# Patient Record
Sex: FEMALE | Race: BLACK OR AFRICAN AMERICAN | Employment: FULL TIME | URBAN - METROPOLITAN AREA
[De-identification: names, ages, dates, MRNs, and addresses within clinical notes are randomized per-mention and may not be internally consistent; named-entity substitution may affect disease eponyms.]

---

## 2024-06-13 ENCOUNTER — APPOINTMENT (EMERGENCY)
Dept: MRI IMAGING | Facility: HOSPITAL | Age: 39
End: 2024-06-13
Payer: COMMERCIAL

## 2024-06-13 ENCOUNTER — APPOINTMENT (EMERGENCY)
Dept: ULTRASOUND IMAGING | Facility: HOSPITAL | Age: 39
End: 2024-06-13
Payer: COMMERCIAL

## 2024-06-13 ENCOUNTER — HOSPITAL ENCOUNTER (OUTPATIENT)
Facility: HOSPITAL | Age: 39
Setting detail: OBSERVATION
Discharge: HOME | End: 2024-06-14
Attending: EMERGENCY MEDICINE | Admitting: STUDENT IN AN ORGANIZED HEALTH CARE EDUCATION/TRAINING PROGRAM
Payer: COMMERCIAL

## 2024-06-13 ENCOUNTER — APPOINTMENT (EMERGENCY)
Dept: CT IMAGING | Facility: HOSPITAL | Age: 39
End: 2024-06-13
Payer: COMMERCIAL

## 2024-06-13 ENCOUNTER — APPOINTMENT (OUTPATIENT)
Dept: NON INVASIVE DIAGNOSTICS | Facility: HOSPITAL | Age: 39
End: 2024-06-13
Payer: COMMERCIAL

## 2024-06-13 DIAGNOSIS — I63.9 CVA (CEREBRAL VASCULAR ACCIDENT) (HCC): Primary | ICD-10-CM

## 2024-06-13 PROBLEM — O20.0 THREATENED MISCARRIAGE IN EARLY PREGNANCY: Status: ACTIVE | Noted: 2024-06-13

## 2024-06-13 PROBLEM — R51.9 HEADACHE: Status: ACTIVE | Noted: 2024-06-13

## 2024-06-13 PROBLEM — I10 HYPERTENSION: Status: ACTIVE | Noted: 2024-06-13

## 2024-06-13 PROBLEM — I63.512 ACUTE ISCHEMIC LEFT MIDDLE CEREBRAL ARTERY (MCA) STROKE (HCC): Status: ACTIVE | Noted: 2024-06-13

## 2024-06-13 LAB
ALBUMIN SERPL BCP-MCNC: 3.7 G/DL (ref 3.5–5)
ALP SERPL-CCNC: 46 U/L (ref 34–104)
ALT SERPL W P-5'-P-CCNC: 23 U/L (ref 7–52)
ANION GAP SERPL CALCULATED.3IONS-SCNC: 6 MMOL/L (ref 4–13)
APTT PPP: 27 SECONDS (ref 23–37)
AST SERPL W P-5'-P-CCNC: 17 U/L (ref 13–39)
B-HCG SERPL-ACNC: 28.1 MIU/ML (ref 0–5)
BACTERIA UR QL AUTO: ABNORMAL /HPF
BASOPHILS # BLD AUTO: 0.02 THOUSANDS/ÂΜL (ref 0–0.1)
BASOPHILS NFR BLD AUTO: 0 % (ref 0–1)
BILIRUB SERPL-MCNC: 0.53 MG/DL (ref 0.2–1)
BILIRUB UR QL STRIP: NEGATIVE
BUN SERPL-MCNC: 10 MG/DL (ref 5–25)
CALCIUM SERPL-MCNC: 8.8 MG/DL (ref 8.4–10.2)
CHLORIDE SERPL-SCNC: 106 MMOL/L (ref 96–108)
CLARITY UR: CLEAR
CO2 SERPL-SCNC: 26 MMOL/L (ref 21–32)
COLOR UR: YELLOW
CREAT SERPL-MCNC: 0.95 MG/DL (ref 0.6–1.3)
EOSINOPHIL # BLD AUTO: 0.17 THOUSAND/ÂΜL (ref 0–0.61)
EOSINOPHIL NFR BLD AUTO: 3 % (ref 0–6)
ERYTHROCYTE [DISTWIDTH] IN BLOOD BY AUTOMATED COUNT: 11.7 % (ref 11.6–15.1)
EXT PREGNANCY TEST URINE: POSITIVE
EXT. CONTROL: ABNORMAL
GFR SERPL CREATININE-BSD FRML MDRD: 77 ML/MIN/1.73SQ M
GLUCOSE SERPL-MCNC: 104 MG/DL (ref 65–140)
GLUCOSE UR STRIP-MCNC: NEGATIVE MG/DL
HCT VFR BLD AUTO: 37.8 % (ref 34.8–46.1)
HGB BLD-MCNC: 12.3 G/DL (ref 11.5–15.4)
HGB UR QL STRIP.AUTO: ABNORMAL
IMM GRANULOCYTES # BLD AUTO: 0.01 THOUSAND/UL (ref 0–0.2)
IMM GRANULOCYTES NFR BLD AUTO: 0 % (ref 0–2)
INR PPP: 1.04 (ref 0.84–1.19)
KETONES UR STRIP-MCNC: NEGATIVE MG/DL
LEUKOCYTE ESTERASE UR QL STRIP: ABNORMAL
LYMPHOCYTES # BLD AUTO: 1.8 THOUSANDS/ÂΜL (ref 0.6–4.47)
LYMPHOCYTES NFR BLD AUTO: 33 % (ref 14–44)
MCH RBC QN AUTO: 29.4 PG (ref 26.8–34.3)
MCHC RBC AUTO-ENTMCNC: 32.5 G/DL (ref 31.4–37.4)
MCV RBC AUTO: 90 FL (ref 82–98)
MONOCYTES # BLD AUTO: 0.52 THOUSAND/ÂΜL (ref 0.17–1.22)
MONOCYTES NFR BLD AUTO: 10 % (ref 4–12)
MUCOUS THREADS UR QL AUTO: ABNORMAL
NEUTROPHILS # BLD AUTO: 2.88 THOUSANDS/ÂΜL (ref 1.85–7.62)
NEUTS SEG NFR BLD AUTO: 54 % (ref 43–75)
NITRITE UR QL STRIP: NEGATIVE
NON-SQ EPI CELLS URNS QL MICRO: ABNORMAL /HPF
NRBC BLD AUTO-RTO: 0 /100 WBCS
PH UR STRIP.AUTO: 6 [PH]
PLATELET # BLD AUTO: 315 THOUSANDS/UL (ref 149–390)
PMV BLD AUTO: 10.2 FL (ref 8.9–12.7)
POTASSIUM SERPL-SCNC: 3.9 MMOL/L (ref 3.5–5.3)
PROT SERPL-MCNC: 6.9 G/DL (ref 6.4–8.4)
PROT UR STRIP-MCNC: ABNORMAL MG/DL
PROTHROMBIN TIME: 13.5 SECONDS (ref 11.6–14.5)
RBC # BLD AUTO: 4.18 MILLION/UL (ref 3.81–5.12)
RBC #/AREA URNS AUTO: ABNORMAL /HPF
SARS-COV-2 AG UPPER RESP QL IA: NEGATIVE
SARS-COV-2 AG UPPER RESP QL IA: NORMAL
SODIUM SERPL-SCNC: 138 MMOL/L (ref 135–147)
SP GR UR STRIP.AUTO: 1.02 (ref 1–1.03)
UROBILINOGEN UR QL STRIP.AUTO: 0.2 E.U./DL
WBC # BLD AUTO: 5.4 THOUSAND/UL (ref 4.31–10.16)
WBC #/AREA URNS AUTO: ABNORMAL /HPF

## 2024-06-13 PROCEDURE — 87811 SARS-COV-2 COVID19 W/OPTIC: CPT | Performed by: STUDENT IN AN ORGANIZED HEALTH CARE EDUCATION/TRAINING PROGRAM

## 2024-06-13 PROCEDURE — 81025 URINE PREGNANCY TEST: CPT | Performed by: EMERGENCY MEDICINE

## 2024-06-13 PROCEDURE — 70498 CT ANGIOGRAPHY NECK: CPT

## 2024-06-13 PROCEDURE — 36415 COLL VENOUS BLD VENIPUNCTURE: CPT | Performed by: EMERGENCY MEDICINE

## 2024-06-13 PROCEDURE — 85610 PROTHROMBIN TIME: CPT | Performed by: EMERGENCY MEDICINE

## 2024-06-13 PROCEDURE — 96361 HYDRATE IV INFUSION ADD-ON: CPT

## 2024-06-13 PROCEDURE — 76815 OB US LIMITED FETUS(S): CPT

## 2024-06-13 PROCEDURE — 85025 COMPLETE CBC W/AUTO DIFF WBC: CPT | Performed by: EMERGENCY MEDICINE

## 2024-06-13 PROCEDURE — 84702 CHORIONIC GONADOTROPIN TEST: CPT | Performed by: EMERGENCY MEDICINE

## 2024-06-13 PROCEDURE — 70551 MRI BRAIN STEM W/O DYE: CPT

## 2024-06-13 PROCEDURE — 99223 1ST HOSP IP/OBS HIGH 75: CPT | Performed by: STUDENT IN AN ORGANIZED HEALTH CARE EDUCATION/TRAINING PROGRAM

## 2024-06-13 PROCEDURE — 85730 THROMBOPLASTIN TIME PARTIAL: CPT | Performed by: EMERGENCY MEDICINE

## 2024-06-13 PROCEDURE — 99291 CRITICAL CARE FIRST HOUR: CPT | Performed by: EMERGENCY MEDICINE

## 2024-06-13 PROCEDURE — 70544 MR ANGIOGRAPHY HEAD W/O DYE: CPT

## 2024-06-13 PROCEDURE — 99245 OFF/OP CONSLTJ NEW/EST HI 55: CPT | Performed by: PSYCHIATRY & NEUROLOGY

## 2024-06-13 PROCEDURE — 80053 COMPREHEN METABOLIC PANEL: CPT | Performed by: EMERGENCY MEDICINE

## 2024-06-13 PROCEDURE — 96375 TX/PRO/DX INJ NEW DRUG ADDON: CPT

## 2024-06-13 PROCEDURE — 81001 URINALYSIS AUTO W/SCOPE: CPT | Performed by: EMERGENCY MEDICINE

## 2024-06-13 PROCEDURE — 96374 THER/PROPH/DIAG INJ IV PUSH: CPT

## 2024-06-13 PROCEDURE — 83036 HEMOGLOBIN GLYCOSYLATED A1C: CPT | Performed by: PSYCHIATRY & NEUROLOGY

## 2024-06-13 PROCEDURE — 99284 EMERGENCY DEPT VISIT MOD MDM: CPT

## 2024-06-13 PROCEDURE — 93005 ELECTROCARDIOGRAM TRACING: CPT

## 2024-06-13 PROCEDURE — 70496 CT ANGIOGRAPHY HEAD: CPT

## 2024-06-13 RX ORDER — LEVOTHYROXINE SODIUM 0.07 MG/1
75 TABLET ORAL DAILY
COMMUNITY

## 2024-06-13 RX ORDER — ACETAMINOPHEN 325 MG/1
650 TABLET ORAL EVERY 6 HOURS PRN
Status: DISCONTINUED | OUTPATIENT
Start: 2024-06-13 | End: 2024-06-14 | Stop reason: HOSPADM

## 2024-06-13 RX ORDER — HEPARIN SODIUM 5000 [USP'U]/ML
5000 INJECTION, SOLUTION INTRAVENOUS; SUBCUTANEOUS EVERY 8 HOURS SCHEDULED
Status: DISCONTINUED | OUTPATIENT
Start: 2024-06-13 | End: 2024-06-14 | Stop reason: HOSPADM

## 2024-06-13 RX ORDER — LABETALOL 200 MG/1
200 TABLET, FILM COATED ORAL 2 TIMES DAILY
COMMUNITY

## 2024-06-13 RX ORDER — LEVOTHYROXINE SODIUM 0.07 MG/1
75 TABLET ORAL
Status: DISCONTINUED | OUTPATIENT
Start: 2024-06-14 | End: 2024-06-14 | Stop reason: HOSPADM

## 2024-06-13 RX ORDER — DIPHENHYDRAMINE HYDROCHLORIDE 50 MG/ML
25 INJECTION INTRAMUSCULAR; INTRAVENOUS ONCE
Status: COMPLETED | OUTPATIENT
Start: 2024-06-13 | End: 2024-06-13

## 2024-06-13 RX ORDER — METOCLOPRAMIDE HYDROCHLORIDE 5 MG/ML
10 INJECTION INTRAMUSCULAR; INTRAVENOUS ONCE
Status: COMPLETED | OUTPATIENT
Start: 2024-06-13 | End: 2024-06-13

## 2024-06-13 RX ORDER — LABETALOL 200 MG/1
200 TABLET, FILM COATED ORAL EVERY 12 HOURS SCHEDULED
Status: DISCONTINUED | OUTPATIENT
Start: 2024-06-13 | End: 2024-06-14 | Stop reason: HOSPADM

## 2024-06-13 RX ORDER — HYDROCODONE BITARTRATE AND ACETAMINOPHEN 5; 325 MG/1; MG/1
1 TABLET ORAL ONCE
Status: DISCONTINUED | OUTPATIENT
Start: 2024-06-13 | End: 2024-06-13

## 2024-06-13 RX ADMIN — LABETALOL HYDROCHLORIDE 200 MG: 200 TABLET, FILM COATED ORAL at 14:54

## 2024-06-13 RX ADMIN — DIPHENHYDRAMINE HYDROCHLORIDE 25 MG: 50 INJECTION, SOLUTION INTRAMUSCULAR; INTRAVENOUS at 07:44

## 2024-06-13 RX ADMIN — METOCLOPRAMIDE 10 MG: 5 INJECTION, SOLUTION INTRAMUSCULAR; INTRAVENOUS at 07:44

## 2024-06-13 RX ADMIN — HEPARIN SODIUM 5000 UNITS: 5000 INJECTION, SOLUTION INTRAVENOUS; SUBCUTANEOUS at 14:54

## 2024-06-13 RX ADMIN — IOHEXOL 85 ML: 350 INJECTION, SOLUTION INTRAVENOUS at 08:18

## 2024-06-13 RX ADMIN — LABETALOL HYDROCHLORIDE 200 MG: 200 TABLET, FILM COATED ORAL at 21:20

## 2024-06-13 RX ADMIN — SODIUM CHLORIDE 1000 ML: 0.9 INJECTION, SOLUTION INTRAVENOUS at 07:45

## 2024-06-13 RX ADMIN — ACETAMINOPHEN 650 MG: 325 TABLET, FILM COATED ORAL at 21:20

## 2024-06-13 RX ADMIN — HEPARIN SODIUM 5000 UNITS: 5000 INJECTION, SOLUTION INTRAVENOUS; SUBCUTANEOUS at 21:20

## 2024-06-13 RX ADMIN — ASPIRIN 81 MG: 81 TABLET, COATED ORAL at 15:05

## 2024-06-13 NOTE — ASSESSMENT & PLAN NOTE
Suspect associated with coagulability effects of hormone changes in/around pregnancy.    -Recommend echocardiogram with bubble study.  -Outpatient cardiology evaluation for possible loop recorder placement.  - Recommend thrombosis panel to be drawn in 6 to 8 weeks.  - If okay with OB and hemoglobin is stable, recommend aspirin 81 mg daily for the next 2 to 3 months.  -Would also recommend aspirin 81 mg daily throughout the course of any future pregnancies or hormone therapies associated with IVF.  -Okay to defer statin given the likely provoked nature of stroke and less likely to be due to atherosclerotic or traditional risk factors.  -Check A1c and FLP.-Will keep overnight for observation.  - No additional recommendations at this time  - Office will call patient arrange outpatient stroke follow-up.  - Please call question/concerns

## 2024-06-13 NOTE — ASSESSMENT & PLAN NOTE
Patient with history of hypertension, states she used to be on amlodipine but is maintained on labetalol in the outpatient setting  Denies known history of preeclampsia  Resume Labetalol 200mg po q12h, BP appears improved since admission  Monitor on telemetry

## 2024-06-13 NOTE — H&P
"[facility]  H&P  Name: Emmett Sharif 38 y.o. female I MRN: [id number]  Unit/Bed#: -01 I Date of Admission: 2024   Date of Service: 2024 I Hospital Day: 0      Assessment & Plan   Threatened miscarriage in early pregnancy  Assessment & Plan  Patient is 5 weeks gestation and presents for vaginal bleeding and decreasing beta-hCG  B-HCG in ED 28.1. Patient has been having routine BHCG monitoring though outside lab (patient states RMA). She tells me her previous HCG values were decreasing at 150 -> 40 -> 35  Ultrasound in ED read as \"No intrauterine gestation or adnexal mass. Differential remains early IUP, spontaneous  and ectopic pregnancy. Correlate with serial quantitative BHCG.\"  OBGYN consulted and we appreciate their expertise. Recommend repeat B-HCG in morning    Headache  Assessment & Plan  Patient presenting for 36 hours of headache accompanied by some mental fogginess.  Found to have acute MCA CVA on MRI  Patient endorses improvement in headache with snacks since admission.  She would like to hold off of opiates until repeat Bhgb in AM in the event that her pregnancy is in fact viable  Tylenol PRN    Hypertension  Assessment & Plan  Patient with history of hypertension, states she used to be on amlodipine but is maintained on labetalol in the outpatient setting  Denies known history of preeclampsia  Resume Labetalol 200mg po q12h, BP appears improved since admission  Monitor on telemetry    Acute ischemic left middle cerebral artery (MCA) stroke (HCC)  Assessment & Plan  This is a pleasant but unfortunate 38-year-old female who is at 5 weeks gestation for headache which started yesterday.  She states that around 10 AM yesterday she had a headache while at work.  She states her BP is usually controlled at home on labetalol but she has not been checking her pressures very closely  She came to the ED where MRIs demonstrating concern for left temporal lobe " CVA  Neurology and OB/GYN were consulted and patient is to be admitted.  Appreciate recommendations regarding further neurologic testing  Obs, echocardiogram ordered, monitor on telemetry  Heparin for DVT prophylaxis  Discussed with OB and we are okay to start aspirin 81 mg daily  Etiology of CVA likely related to hormonal aberrations during pregnancy.  Patient has had trouble conceiving in the past and needed to use IVF.  Unclear if there could be concern for process such as antiphospholipid syndrome, will discuss with neurology the utility of further outpatient testing           VTE Pharmacologic Prophylaxis:   Moderate Risk (Score 3-4) - Pharmacological DVT Prophylaxis Ordered: heparin.  Code Status: Level 1 - Full Code   Discussion with family: Updated  ( and mother) at bedside.    Anticipated Length of Stay: Patient will be admitted on an observation basis with an anticipated length of stay of less than 2 midnights secondary to CVA.    Total Time Spent on Date of Encounter in care of patient: 45 mins. This time was spent on one or more of the following: performing physical exam; counseling and coordination of care; obtaining or reviewing history; documenting in the medical record; reviewing/ordering tests, medications or procedures; communicating with other healthcare professionals and discussing with patient's family/caregivers.    Chief Complaint: Headache    History of Present Illness:  Emmett Sharif is a 38 y.o. female with a PMH of hypertension who presents with headache.  She is at 5 weeks gestation and states that she has had trouble conceiving in the past.  She has had successful pregnancies using IVF and this is her first confirmed pregnancy that had been conceived naturally.  She reports being in a normal state of health however beta-hCG levels have been decreasing in the outpatient setting with continued monitoring.  She notes that she developed a severe headache at around 10 AM  yesterday 6/12.  This occurred while at work and patient tried to manage her symptoms conservatively.  It became so bad that she decided to leave work early to come home and rest.  Tylenol did not seem to help symptoms.  Today she woke up and her headache was so bad that she knew she needed to come to the ED.  Workup in the ED included MRI which unfortunately appears concerning for acute temporal/occipital CVA.  Also of concern, patient has had some vaginal bleeding and her beta-hCG level has been decreasing there is concern that her pregnancy is not viable.  OB was consulted and recommended that patient have repeat beta-hCG in the morning.  At present patient feels comfortable.  She states that her headache is improving somewhat with snacks.  She has no new complaints at present    Review of Systems:  Review of Systems   Constitutional:  Negative for chills and fever.   HENT:  Negative for ear pain and sore throat.    Eyes:  Negative for visual disturbance.   Respiratory:  Negative for cough and shortness of breath.    Cardiovascular:  Negative for chest pain and palpitations.   Gastrointestinal:  Negative for abdominal pain and vomiting.   Genitourinary:  Negative for dysuria and hematuria.   Musculoskeletal:  Negative for arthralgias and back pain.   Skin:  Negative for color change and rash.   Neurological:  Negative for syncope.   All other systems reviewed and are negative.      Past Medical and Surgical History:   Past Medical History:   Diagnosis Date    Hypertension        History reviewed. No pertinent surgical history.    Meds/Allergies:  Prior to Admission medications    Medication Sig Start Date End Date Taking? Authorizing Provider   labetalol (NORMODYNE) 200 mg tablet Take 200 mg by mouth 2 (two) times a day   Yes Historical Provider, MD   semaglutide, 1 mg/dose, (Ozempic, 1 MG/DOSE,) 4 mg/3 mL injection pen Inject 1 mg under the skin every 7 days 5/18/24  Yes Historical Provider, MD   levothyroxine  "75 mcg tablet Take 75 mcg by mouth daily    Historical Provider, MD     I have reviewed home medications with patient personally.    Allergies: No Known Allergies    Social History:  Marital Status: /Civil Union   Occupation: Education tech  Patient Pre-hospital Living Situation: Home  Patient Pre-hospital Level of Mobility: walks  Patient Pre-hospital Diet Restrictions: N/A  Substance Use History:   Social History     Substance and Sexual Activity   Alcohol Use Never     Social History     Tobacco Use   Smoking Status Never   Smokeless Tobacco Never     Social History     Substance and Sexual Activity   Drug Use Never       Family History:  History reviewed. No pertinent family history.    Physical Exam:     Vitals:   Blood Pressure: 131/80 (06/13/24 1633)  Pulse: 69 (06/13/24 1633)  Temperature: 97.5 °F (36.4 °C) (06/13/24 1633)  Temp Source: Oral (06/13/24 1633)  Respirations: 18 (06/13/24 1633)  Height: 5' 4\" (162.6 cm) (06/13/24 1337)  Weight - Scale: 96.6 kg (213 lb) (06/13/24 1337)  SpO2: 97 % (06/13/24 1633)    Physical Exam  Vitals reviewed.   Constitutional:       General: She is not in acute distress.     Appearance: She is not ill-appearing.   HENT:      Head: Normocephalic.      Mouth/Throat:      Mouth: Mucous membranes are moist.   Eyes:      General: No scleral icterus.     Extraocular Movements: Extraocular movements intact.   Cardiovascular:      Rate and Rhythm: Normal rate and regular rhythm.      Pulses: Normal pulses.      Heart sounds: No murmur heard.     No friction rub. No gallop.   Pulmonary:      Effort: Pulmonary effort is normal. No respiratory distress.      Breath sounds: No wheezing, rhonchi or rales.   Abdominal:      General: Abdomen is flat. Bowel sounds are normal. There is no distension.      Palpations: Abdomen is soft.      Tenderness: There is no abdominal tenderness. There is no guarding or rebound.   Musculoskeletal:      Right lower leg: No edema.      Left lower " "leg: No edema.   Skin:     General: Skin is warm.      Capillary Refill: Capillary refill takes less than 2 seconds.      Coloration: Skin is not jaundiced.      Findings: No rash.   Neurological:      General: No focal deficit present.      Mental Status: She is alert and oriented to person, place, and time.      Sensory: No sensory deficit.      Motor: No weakness.   Psychiatric:         Mood and Affect: Mood normal.         Behavior: Behavior normal.          Additional Data:     Lab Results:  Results from last 7 days   Lab Units 06/13/24  0727   WBC Thousand/uL 5.40   HEMOGLOBIN g/dL 12.3   HEMATOCRIT % 37.8   PLATELETS Thousands/uL 315   SEGS PCT % 54   LYMPHO PCT % 33   MONO PCT % 10   EOS PCT % 3     Results from last 7 days   Lab Units 06/13/24  0727   SODIUM mmol/L 138   POTASSIUM mmol/L 3.9   CHLORIDE mmol/L 106   CO2 mmol/L 26   BUN mg/dL 10   CREATININE mg/dL 0.95   ANION GAP mmol/L 6   CALCIUM mg/dL 8.8   ALBUMIN g/dL 3.7   TOTAL BILIRUBIN mg/dL 0.53   ALK PHOS U/L 46   ALT U/L 23   AST U/L 17   GLUCOSE RANDOM mg/dL 104     Results from last 7 days   Lab Units 06/13/24  0727   INR  1.04         No results found for: \"HGBA1C\"        Lines/Drains:  Invasive Devices       Peripheral Intravenous Line  Duration             Peripheral IV 06/13/24 Right Antecubital <1 day                        Imaging: Reviewed radiology reports from this admission including: MRI brain  MRV head wo contrast   Final Result by Joni Gandhi DO (06/13 1039)      No evidence of dural venous sinus thrombosis.               Workstation performed: [id number]         MRI brain wo contrast   Final Result by Joni Gandhi DO (06/13 1041)      Cortical diffusion abnormality in the left temporal lobe with some involvement of the left lateral temporal occipital periventricular white matter compatible with recent infarct. Matching defects are identified on the FLAIR sequence. No hemorrhage    identified.      I personally " discussed this study with BECK HOOVER on 2024 10:40 AM.            Workstation performed: [id number]         US OB pregnancy limited with transvaginal   Final Result by Alirio Best MD ([id number])      No intrauterine gestation or adnexal mass.      Differential remains early IUP, spontaneous  and ectopic pregnancy.  Correlate with serial quantitative BHCG.            Workstation performed: [id number]         CTA head and neck with and without contrast   Final Result by Joni Gandhi DO ([id number])      Loss of gray-white differentiation left superior temporal gyrus. Differential considerations include recent infarct with either arterial or venous etiologies to be considered. Less likely etiologies to include encephalitis or neoplastic processes.      Mild atheromatous plaque identified at the left carotid bifurcation. Correlate for cardiovascular risk factors.      No significant carotid or vertebral artery stenosis.                     I personally discussed this study with BECK HOOVER on 2024 8:31 AM.                        Workstation performed: [id number]             EKG and Other Studies Reviewed on Admission:   EKG: Personally Reviewed. NSR. HR 67.    ** Please Note: This note has been constructed using a voice recognition system. **

## 2024-06-13 NOTE — TELEMEDICINE
TeleConsultation - Neurology   Emmett Sharif 38 y.o. female MRN: [id number]  Unit/Bed#: -01 Encounter: [id number]      VIRTUAL CARE DOCUMENTATION:     1. This service was provided via Telemedicine using [employer] Kit     2. Parties in the room with patient during teleconsult Family member:  and aunt     3. Confidentiality My office door was closed     4. Participants No one else was in the room    5. Patient acknowledged consent and understanding of privacy and security of the  Telemedicine consult. I informed the patient that I have reviewed their record in Epic and presented the opportunity for them to ask any questions regarding the visit today.  The patient agreed to participate.    6. Time spent        Assessment & Plan     * Acute ischemic left middle cerebral artery (MCA) stroke (HCC)  Assessment & Plan  Suspect associated with coagulability effects of hormone changes in/around pregnancy.    -Recommend echocardiogram with bubble study.  -Outpatient cardiology evaluation for possible loop recorder placement.  - Recommend thrombosis panel to be drawn in 6 to 8 weeks.  - If okay with OB and hemoglobin is stable, recommend aspirin 81 mg daily for the next 2 to 3 months.  -Would also recommend aspirin 81 mg daily throughout the course of any future pregnancies or hormone therapies associated with IVF.  -Okay to defer statin given the likely provoked nature of stroke and less likely to be due to atherosclerotic or traditional risk factors.  -Check A1c and FLP.-Will keep overnight for observation.  - No additional recommendations at this time  - Office will call patient arrange outpatient stroke follow-up.  - Please call question/concerns          Emmett Sharif will need follow up in in 6 weeks with neurovascular attending. She will not require outpatient neurological testing.    History of Present Illness     Reason for Consult / Principal Problem: Woke  Hx and PE limited by: Not applicable  HPI:  Emmett Sharif is a 38 y.o. right handed female who is 5 weeks pregnant via IVF but with abnormal blood work suggestive of a nonviable pregnancy and recent heavy bleeding over the past few days which OB thinks is likely a miscarriage, Who presents with sudden onset headache.  - Patient admits to some intermittent word finding difficulties yesterday at work.  Denies any currently.  -Blood pressure has been labile with readings in the 180s and 130s.  - CTA head and neck was unremarkable for any hemodynamically significant stenosis.  - MRI brain demonstrates embolic appearing acute to subacute infarct in the left MCA territory.  - MRV was negative for any CVST.        Consult to neurology  Consult performed by: Og Stewart DO  Consult ordered by: David Ledbetter DO           Review of Systems   Constitutional:  Negative for chills and fever.   HENT:  Negative for facial swelling and hearing loss.    Eyes:  Negative for pain and discharge.   Respiratory:  Negative for cough, choking and shortness of breath.    Cardiovascular:  Negative for chest pain.   Gastrointestinal:  Negative for constipation, diarrhea, nausea and vomiting.   Endocrine: Negative for cold intolerance and heat intolerance.   Genitourinary:  Negative for difficulty urinating, frequency and urgency.   Skin:  Negative for color change and rash.   Neurological:  Negative for dizziness, facial asymmetry, weakness, light-headedness, numbness and headaches.   All other systems reviewed and are negative.      Historical Information   Past Medical History:   Diagnosis Date    Hypertension      History reviewed. No pertinent surgical history.  Social History   Social History     Substance and Sexual Activity   Alcohol Use Never     Social History     Substance and Sexual Activity   Drug Use Never     E-Cigarette/Vaping    E-Cigarette Use Never User      E-Cigarette/Vaping Substances     Social History     Tobacco Use   Smoking Status Never   Smokeless  "Tobacco Never     Family History: non-contributory    Review of previous medical records was  completed.     Meds/Allergies   all current active meds have been reviewed    No Known Allergies    Objective   Vitals:Blood pressure 130/78, pulse 73, temperature 98.2 °F (36.8 °C), temperature source Oral, resp. rate 16, height 5' 4\" (1.626 m), weight 96.6 kg (213 lb), SpO2 97%.,Body mass index is 36.56 kg/m².    Intake/Output Summary (Last 24 hours) at 6/14/2024 1243  Last data filed at 6/14/2024 0501  Gross per 24 hour   Intake 480 ml   Output --   Net 480 ml       Invasive Devices:   Invasive Devices       Peripheral Intravenous Line  Duration             Peripheral IV 06/13/24 Right Antecubital 1 day                    Physical Exam  Constitutional:       General: She is not in acute distress.     Appearance: Normal appearance. She is normal weight. She is not ill-appearing, toxic-appearing or diaphoretic.   HENT:      Head: Normocephalic.      Right Ear: External ear normal.      Left Ear: External ear normal.   Eyes:      General: No scleral icterus.        Right eye: No discharge.         Left eye: No discharge.      Extraocular Movements: Extraocular movements intact.      Conjunctiva/sclera: Conjunctivae normal.      Pupils: Pupils are equal, round, and reactive to light.   Pulmonary:      Effort: Pulmonary effort is normal. No respiratory distress.      Breath sounds: No stridor.   Skin:     Coloration: Skin is not jaundiced or pale.   Neurological:      General: No focal deficit present.      Mental Status: She is alert.      Cranial Nerves: No cranial nerve deficit.      Sensory: No sensory deficit.      Motor: No weakness.      Coordination: Coordination normal.   Psychiatric:         Mood and Affect: Mood normal.         Behavior: Behavior normal.         Thought Content: Thought content normal.         Judgment: Judgment normal.       Neurologic Exam     Mental Status   Level of consciousness:  - awake and " alert    Language:  -fluent, comprehension intact      Visual-spatial:  - no clear signs of hemineglect.  -follow commands equally on both sides.  -     Cranial Nerves     CN III, IV, VI   Pupils are equal, round, and reactive to light.  Pupils are equal and round  Extraocular muscles intact, gaze conjugate.  Face appears symmetric with respect to motor.  Tongue is midline.  Shoulder shrug is symmetric.       Motor Exam Patient moves all 4 extremities equally without drift.  Bulk appears normal     Gait, Coordination, and Reflexes  no gross ataxia in any limb.  Finger-to-nose was intact and symmetric  Gait was deferred       Lab Results: I have personally reviewed pertinent reports.    Imaging Studies: I have personally reviewed pertinent reports.   and I have personally reviewed pertinent films in PACS  EKG, Pathology, and Other Studies: I have personally reviewed pertinent reports.    VTE Prophylaxis: Sequential compression device (Venodyne)     Code Status: Level 1 - Full Code  Advance Directive and Living Will:      Power of :    POLST:      Counseling / Coordination of Care  N/A

## 2024-06-13 NOTE — ASSESSMENT & PLAN NOTE
"Patient is 5 weeks gestation and presents for vaginal bleeding and decreasing beta-hCG  B-HCG in ED 28.1, inappropriate for current gestational age base on laboratory reference values. Patient has been having routine BHCG monitoring though outside lab (these are not visible to me in chart. Patient states she has been tracking them and the lab is with A). She tells me her previous HCG values were decreasing at 150 -> 40 -> 35  Ultrasound in ED read as \"No intrauterine gestation or adnexal mass. Differential remains early IUP, spontaneous  and ectopic pregnancy. Correlate with serial quantitative BHCG.\"  OBGYN consulted and we appreciate their expertise. Recommend repeat B-HCG in morning  "

## 2024-06-13 NOTE — PLAN OF CARE
Problem: Potential for Falls  Goal: Patient will remain free of falls  Description: INTERVENTIONS:  - Educate patient/family on patient safety including physical limitations  - Instruct patient to call for assistance with activity   - Consult OT/PT to assist with strengthening/mobility   - Keep Call bell within reach  - Keep bed low and locked with side rails adjusted as appropriate  - Keep care items and personal belongings within reach  - Initiate and maintain comfort rounds  - Make Fall Risk Sign visible to staff  - Apply yellow socks and bracelet for high fall risk patients  - Consider moving patient to room near nurses station  Outcome: Progressing     Problem: Neurological Deficit  Goal: Neurological status is stable or improving  Description: Interventions:  - Monitor and assess patient's level of consciousness, motor function, sensory function, and level of assistance needed for ADLs.   - Monitor and report changes from baseline. Collaborate with interdisciplinary team to initiate plan and implement interventions as ordered.   - Provide and maintain a safe environment.  - Consider seizure precautions.  - Consider fall precautions.  - Consider aspiration precautions.  - Consider bleeding precautions.  Outcome: Progressing     Problem: Activity Intolerance/Impaired Mobility  Goal: Mobility/activity is maintained at optimum level for patient  Description: Interventions:  - Assess and monitor patient  barriers to mobility and need for assistive/adaptive devices.  - Assess patient's emotional response to limitations.  - Collaborate with interdisciplinary team and initiate plans and interventions as ordered.  - Encourage independent activity per ability.  - Maintain proper body alignment.  - Perform active/passive rom as tolerated/ordered.  - Plan activities to conserve energy.  - Turn patient as appropriate  Outcome: Progressing     Problem: Communication Impairment  Goal: Ability to express needs and  understand communication  Description: Assess patient's communication skills and ability to understand information.  Patient will demonstrate use of effective communication techniques, alternative methods of communication and understanding even if not able to speak.     - Encourage communication and provide alternate methods of communication as needed.  - Collaborate with case management/ for discharge needs.  - Include patient/family/caregiver in decisions related to communication.  Outcome: Progressing     Problem: Potential for Aspiration  Goal: Non-ventilated patient's risk of aspiration is minimized  Description: Assess and monitor vital signs, respiratory status, and labs (WBC).  Monitor for signs of aspiration (tachypnea, cough, rales, wheezing, cyanosis, fever).    - Assess and monitor patient's ability to swallow.  - Place patient up in chair to eat if possible.  - HOB up at 90 degrees to eat if unable to get patient up into chair.  - Supervise patient during oral intake.   - Instruct patient/ family to take small bites.  - Instruct patient/ family to take small single sips when taking liquids.  - Follow patient-specific strategies generated by speech pathologist.  Outcome: Progressing     Problem: Nutrition  Goal: Nutrition/Hydration status is improving  Description: Monitor and assess patient's nutrition/hydration status for malnutrition (ex- brittle hair, bruises, dry skin, pale skin and conjunctiva, muscle wasting, smooth red tongue, and disorientation). Collaborate with interdisciplinary team and initiate plan and interventions as ordered.  Monitor patient's weight and dietary intake as ordered or per policy. Utilize nutrition screening tool and intervene per policy. Determine patient's food preferences and provide high-protein, high-caloric foods as appropriate.     - Assist patient with eating.  - Allow adequate time for meals.  - Encourage patient to take dietary supplement as  ordered.  - Collaborate with clinical nutritionist.  - Include patient/family/caregiver in decisions related to nutrition.  Outcome: Progressing

## 2024-06-13 NOTE — Clinical Note
Case was discussed with Dr Castañeda and the patient's admission status was agreed to be Admission Status: inpatient status to the service of Dr. Castañeda .

## 2024-06-13 NOTE — ASSESSMENT & PLAN NOTE
This is a pleasant but unfortunate 38-year-old female who is at 5 weeks gestation for headache which started yesterday.  She states that around 10 AM yesterday she had a headache while at work.  She states her BP is usually controlled at home on labetalol but she has not been checking her pressures very closely  She came to the ED where MRIs demonstrating concern for left temporal lobe CVA  Neurology and OB/GYN were consulted and patient is to be admitted.  Appreciate recommendations regarding further neurologic testing  Obs, echocardiogram ordered, monitor on telemetry  Heparin for DVT prophylaxis  Discussed with OB and we are okay to start aspirin 81 mg daily  Etiology of CVA likely related to hormonal aberrations during pregnancy.  Patient has had trouble conceiving in the past and needed to use IVF.  Unclear if there could be concern for process such as antiphospholipid syndrome, will discuss with neurology the utility of further outpatient testing

## 2024-06-13 NOTE — ED PROVIDER NOTES
"History  Chief Complaint   Patient presents with    Headache     Since yesterday having severe HA unrelieved by 4 tylenol. Pt states at onset of HA was feeling \"fuzzy\" followed by pain on left side of head/eye. Denies N/V, chest pain, SOB, dizziness, and hx of migraines     Threatened Miscarriage     For the last 5 days having intermittent bleeding which has gotten heavier in the last 2 days. Pt has to change pad every few hours. Pt states she has received IVF multiple times and this is first miscarriage. Pt does have hx of HBP and took labetalol this AM (Bps usually 130s)     This is a 38-year-old female who presents to the emergency department complaining of a headache.  The patient states that it began yesterday.  She states he felt fuzzy in the morning and then had a severe sudden onset of a left-sided frontal headache.  She had some mild nausea before but no vomiting.  She denies nausea at this point.  She denies fevers or chills.  She denies chest pain or trouble breathing.  Patient also states that she is approximately 5 weeks pregnant, but had blood work showing that the labs were abnormal and that her pregnancy was likely not viable.  She states that today she started with heavy vaginal bleeding.  She states this feels similar to her normal menstrual cramps but is more intense.  She denies urinary symptoms.        Prior to Admission Medications   Prescriptions Last Dose Informant Patient Reported? Taking?   labetalol (NORMODYNE) 200 mg tablet 6/12/2024  Yes Yes   Sig: Take 200 mg by mouth 2 (two) times a day   levothyroxine 75 mcg tablet   Yes No   Sig: Take 75 mcg by mouth daily   semaglutide, 1 mg/dose, (Ozempic, 1 MG/DOSE,) 4 mg/3 mL injection pen   Yes Yes   Sig: Inject 1 mg under the skin every 7 days      Facility-Administered Medications: None       Past Medical History:   Diagnosis Date    Hypertension        History reviewed. No pertinent surgical history.    History reviewed. No pertinent family " history.  I have reviewed and agree with the history as documented.    E-Cigarette/Vaping    E-Cigarette Use Never User      E-Cigarette/Vaping Substances     Social History     Tobacco Use    Smoking status: Never    Smokeless tobacco: Never   Vaping Use    Vaping status: Never Used   Substance Use Topics    Alcohol use: Never    Drug use: Never       Review of Systems   All other systems reviewed and are negative.      Physical Exam  Physical Exam  Constitutional:  Vital signs reviewed, patient appears uncomfortable  Eyes: Pupils equal round reactive to light and accommodation, extraocular muscles intact  HEENT: trachea midline, no JVD, moist mucous membranes  Respiratory: lung sounds clear throughout, no rhonchi, no rales  Cardiovascular: regular rate rhythm, no murmurs or rubs  Abdomen: soft, lower abdominal tenderness, nondistended, no rebound or guarding  Back: no CVA tenderness, normal inspection  Extremities: no edema, pulses equal in all 4 extremities  Neuro: awake, alert, oriented, no focal weakness  Skin: warm, dry, intact, no rashes noted    Vital Signs  ED Triage Vitals   Temperature Pulse Respirations Blood Pressure SpO2   06/13/24 0709 06/13/24 0709 06/13/24 0709 06/13/24 0709 06/13/24 0709   98 °F (36.7 °C) 62 17 (!) 181/95 98 %      Temp Source Heart Rate Source Patient Position - Orthostatic VS BP Location FiO2 (%)   06/13/24 0709 06/13/24 0709 06/13/24 1337 06/13/24 0709 --   Oral Monitor Lying Right arm       Pain Score       06/13/24 0709       10 - Worst Possible Pain           Vitals:    06/13/24 1100 06/13/24 1337 06/13/24 1435 06/13/24 1530   BP: (!) 188/85 134/92 150/87 129/87   Pulse: 78 70 64 79   Patient Position - Orthostatic VS:  Lying Lying Sitting         Visual Acuity  Visual Acuity      Flowsheet Row Most Recent Value   L Pupil Size (mm) 2   R Pupil Size (mm) 2            ED Medications  Medications   acetaminophen (TYLENOL) tablet 650 mg (has no administration in time range)    levothyroxine tablet 75 mcg (has no administration in time range)   labetalol (NORMODYNE) tablet 200 mg (200 mg Oral Given 6/13/24 1454)   heparin (porcine) subcutaneous injection 5,000 Units (5,000 Units Subcutaneous Given 6/13/24 1454)   HYDROcodone-acetaminophen (NORCO) 5-325 mg per tablet 1 tablet (has no administration in time range)   aspirin (ECOTRIN LOW STRENGTH) EC tablet 81 mg (81 mg Oral Given 6/13/24 1505)   metoclopramide (REGLAN) injection 10 mg (10 mg Intravenous Given 6/13/24 0744)   diphenhydrAMINE (BENADRYL) injection 25 mg (25 mg Intravenous Given 6/13/24 0744)   sodium chloride 0.9 % bolus 1,000 mL (1,000 mL Intravenous New Bag 6/13/24 0745)   iohexol (OMNIPAQUE) 350 MG/ML injection (SINGLE-DOSE) 85 mL (85 mL Intravenous Given 6/13/24 0818)       Diagnostic Studies  Results Reviewed       Procedure Component Value Units Date/Time    Hemoglobin A1C [[id number]]     Lab Status: No result Specimen: Blood     Urine Microscopic [[id number]]  (Abnormal) Collected: 06/13/24 0744    Lab Status: Final result Specimen: Urine, Clean Catch Updated: 06/13/24 0822     RBC, UA 20-30 /hpf      WBC, UA 10-20 /hpf      Epithelial Cells Moderate /hpf      Bacteria, UA Occasional /hpf      MUCUS THREADS Occasional    UA (URINE) with reflex to Scope [[id number]]  (Abnormal) Collected: 06/13/24 0744    Lab Status: Final result Specimen: Urine, Clean Catch Updated: 06/13/24 0811     Color, UA Yellow     Clarity, UA Clear     Specific Gravity, UA 1.020     pH, UA 6.0     Leukocytes, UA 1+     Nitrite, UA Negative     Protein, UA Trace mg/dl      Glucose, UA Negative mg/dl      Ketones, UA Negative mg/dl      Urobilinogen, UA 0.2 E.U./dl      Bilirubin, UA Negative     Occult Blood, UA 3+    hCG, quantitative [[id number]]  (Abnormal) Collected: 06/13/24 0727    Lab Status: Final result Specimen: Blood from Arm, Right Updated: 06/13/24 0803     HCG, Quant 28.1 mIU/mL     Narrative:       Expected Ranges:    HCG results  between 5.0 and 25.0 mIU/mL may be indicative of early pregnancy but should be interpreted in light of the total clinical presentation.    HCG can rise to detectable levels in yandel and post menopausal women (0-11.6 mIU/mL).     Approximate               Approximate HCG  Gestation age          Concentration ( mIU/mL)  _____________          ______________________   Weeks                      HCG values  0.2-1                       5-50  1-2                           2-3                         100-5000  3-4                         500-02468  4-5                         1000-97324  5-6                         [id number]  6-8                         [id number]  8-12                        [id number]      Comprehensive metabolic panel [[id number]] Collected: 06/13/24 0727    Lab Status: Final result Specimen: Blood from Arm, Right Updated: 06/13/24 0755     Sodium 138 mmol/L      Potassium 3.9 mmol/L      Chloride 106 mmol/L      CO2 26 mmol/L      ANION GAP 6 mmol/L      BUN 10 mg/dL      Creatinine 0.95 mg/dL      Glucose 104 mg/dL      Calcium 8.8 mg/dL      AST 17 U/L      ALT 23 U/L      Alkaline Phosphatase 46 U/L      Total Protein 6.9 g/dL      Albumin 3.7 g/dL      Total Bilirubin 0.53 mg/dL      eGFR 77 ml/min/1.73sq m     Narrative:      National Kid[employer] Foundation guidelines for Chronic Kidney Disease (CKD):     Stage 1 with normal or high GFR (GFR > 90 mL/min/1.73 square meters)    Stage 2 Mild CKD (GFR = 60-89 mL/min/1.73 square meters)    Stage 3A Moderate CKD (GFR = 45-59 mL/min/1.73 square meters)    Stage 3B Moderate CKD (GFR = 30-44 mL/min/1.73 square meters)    Stage 4 Severe CKD (GFR = 15-29 mL/min/1.73 square meters)    Stage 5 End Stage CKD (GFR <15 mL/min/1.73 square meters)  Note: GFR calculation is accurate only with a steady state creatinine    POCT pregnancy, urine [[id number]]  (Abnormal) Resulted: 06/13/24 0751    Lab Status: Final result Updated: 06/13/24 0752     EXT Preg  Test, Ur Positive     Control Valid    Protime-INR [[id number]]  (Normal) Collected: 06/13/24 0727    Lab Status: Final result Specimen: Blood from Arm, Right Updated: 06/13/24 0751     Protime 13.5 seconds      INR 1.04    APTT [[id number]]  (Normal) Collected: 06/13/24 0727    Lab Status: Final result Specimen: Blood from Arm, Right Updated: 06/13/24 0751     PTT 27 seconds     CBC and differential [[id number]] Collected: 06/13/24 0727    Lab Status: Final result Specimen: Blood from Arm, Right Updated: 06/13/24 0739     WBC 5.40 Thousand/uL      RBC 4.18 Million/uL      Hemoglobin 12.3 g/dL      Hematocrit 37.8 %      MCV 90 fL      MCH 29.4 pg      MCHC 32.5 g/dL      RDW 11.7 %      MPV 10.2 fL      Platelets 315 Thousands/uL      nRBC 0 /100 WBCs      Segmented % 54 %      Immature Grans % 0 %      Lymphocytes % 33 %      Monocytes % 10 %      Eosinophils Relative 3 %      Basophils Relative 0 %      Absolute Neutrophils 2.88 Thousands/µL      Absolute Immature Grans 0.01 Thousand/uL      Absolute Lymphocytes 1.80 Thousands/µL      Absolute Monocytes 0.52 Thousand/µL      Eosinophils Absolute 0.17 Thousand/µL      Basophils Absolute 0.02 Thousands/µL                    MRV head wo contrast   Final Result by Joni Gandhi DO (06/13 1039)      No evidence of dural venous sinus thrombosis.               Workstation performed: [id number]         MRI brain wo contrast   Final Result by Joni Gandhi DO (06/13 1041)      Cortical diffusion abnormality in the left temporal lobe with some involvement of the left lateral temporal occipital periventricular white matter compatible with recent infarct. Matching defects are identified on the FLAIR sequence. No hemorrhage    identified.      I personally discussed this study with BECK HOOVER on 6/13/2024 10:40 AM.            Workstation performed: [id number]          OB pregnancy limited with transvaginal   Final Result by Alirio Best MD  (922)      No intrauterine gestation or adnexal mass.      Differential remains early IUP, spontaneous  and ectopic pregnancy.  Correlate with serial quantitative BHCG.            Workstation performed: [id number]         CTA head and neck with and without contrast   Final Result by Joni Gandhi DO (835)      Loss of gray-white differentiation left superior temporal gyrus. Differential considerations include recent infarct with either arterial or venous etiologies to be considered. Less likely etiologies to include encephalitis or neoplastic processes.      Mild atheromatous plaque identified at the left carotid bifurcation. Correlate for cardiovascular risk factors.      No significant carotid or vertebral artery stenosis.                     I personally discussed this study with BECK LEDBETTER on 2024 8:31 AM.                        Workstation performed: [id number]                    Procedures  ECG 12 Lead Documentation Only    Date/Time: 2024 4:14 PM    Performed by: Beck Ledbetter DO  Authorized by: Beck Ledbetter DO    ECG reviewed by me, the ED Provider: yes    Patient location:  ED  Comments:      Normal sinus rhythm, rate of 67, normal NH, normal QTc, no STEMI, no prior EKG available, EKG independently interpreted by me  CriticalCare Time    Date/Time: 2024 4:14 PM    Performed by: Beck Ledbetter DO  Authorized by: Beck Ledbetter DO    Critical care provider statement:     Critical care time (minutes):  30    Critical care time was exclusive of:  Separately billable procedures and treating other patients and teaching time    Critical care was necessary to treat or prevent imminent or life-threatening deterioration of the following conditions:  CNS failure or compromise    Critical care was time spent personally by me on the following activities:  Obtaining history from patient or surrogate, development of treatment plan with patient or surrogate,  discussions with consultants, evaluation of patient's response to treatment, examination of patient, ordering and performing treatments and interventions, ordering and review of laboratory studies, ordering and review of radiographic studies and re-evaluation of patient's condition  Comments:      Acute stroke requiring consultation with neurology, radiology.           ED Course  ED Course as of 06/13/24 1619   Thu Jun 13, 2024   0850 Discussed the patient with radiology, states needs a MRI/MRV for further evaluation. I disscussed the patient with Dr Butler from GYN. She recommends treating the patient as not pregnant.      1128 The patient has a MRI that shows a CVA as per Radiology. She remains at a NIH of 0. I discussed with Neuro. they recommended admission for further evaluation.  I spoke with GYN they recommend a repeat beta-hCG tomorrow.  Patient will be admitted for further care and evaluation.                  Stroke Assessment       Row Name 06/13/24 0902             NIH Stroke Scale    Interval Baseline      Level of Consciousness (1a.) 0      LOC Questions (1b.) 0      LOC Commands (1c.) 0      Best Gaze (2.) 0      Visual (3.) 0      Facial Palsy (4.) 0      Motor Arm, Left (5a.) 0      Motor Arm, Right (5b.) 0      Motor Leg, Left (6a.) 0      Motor Leg, Right (6b.) 0      Limb Ataxia (7.) 0      Sensory (8.) 0      Best Language (9.) 0      Dysarthria (10.) 0      Extinction and Inattention (11.) (Formerly Neglect) 0      Total 0                                SBIRT 22yo+      Flowsheet Row Most Recent Value   Initial Alcohol Screen: US AUDIT-C     1. How often do you have a drink containing alcohol? 0 Filed at: 06/13/2024 0711   2. How many drinks containing alcohol do you have on a typical day you are drinking?  0 Filed at: 06/13/2024 0711   3a. Male UNDER 65: How often do you have five or more drinks on one occasion? 0 Filed at: 06/13/2024 0711   3b. FEMALE Any Age, or MALE 65+: How often do you  have 4 or more drinks on one occassion? 0 Filed at: 2024   Audit-C Score 0 Filed at: 2024   LILY: How many times in the past year have you...    Used an illegal drug or used a prescription medication for non-medical reasons? Never Filed at: 2024                      Medical Decision Making  This is a 38-year-old female who presents to the emergency department with headache and vaginal bleeding.  I considered ICH, migraine, atypical headache, threatened , , ectopic. These and other diagnoses were considered.         Amount and/or Complexity of Data Reviewed  Labs: ordered. Decision-making details documented in ED Course.  Radiology: ordered. Decision-making details documented in ED Course.  ECG/medicine tests: ordered and independent interpretation performed. Decision-making details documented in ED Course.    Risk  Prescription drug management.  Decision regarding hospitalization.             Disposition  Final diagnoses:   CVA (cerebral vascular accident) (HCC)     Time reflects when diagnosis was documented in both MDM as applicable and the Disposition within this note       Time User Action Codes Description Comment    2024 11:45 AM David Ledbetter Add [I63.9] CVA (cerebral vascular accident) (HCC)           ED Disposition       ED Disposition   Admit    Condition   Stable    Date/Time   Thu 2024 1153    Comment   Case was discussed with Dr Castañeda and the patient's admission status was agreed to be Observation Status: observation status to the service of Dr. Castañeda .               Follow-up Information    None         Current Discharge Medication List        CONTINUE these medications which have NOT CHANGED    Details   labetalol (NORMODYNE) 200 mg tablet Take 200 mg by mouth 2 (two) times a day      semaglutide, 1 mg/dose, (Ozempic, 1 MG/DOSE,) 4 mg/3 mL injection pen Inject 1 mg under the skin every 7 days      levothyroxine 75 mcg tablet Take 75 mcg  by mouth daily             No discharge procedures on file.    PDMP Review       None            ED Provider  Electronically Signed by             David Ledbetter DO  06/13/24 8454

## 2024-06-13 NOTE — ASSESSMENT & PLAN NOTE
Patient presenting for 36 hours of headache accompanied by some mental fogginess.  Found to have acute MCA CVA on MRI  Patient endorses improvement in headache with snacks since admission.  She would like to hold off of opiates until repeat Bhgb in AM in the event that her pregnancy is in fact viable  Tylenol PRN

## 2024-06-14 ENCOUNTER — APPOINTMENT (OUTPATIENT)
Dept: NON INVASIVE DIAGNOSTICS | Facility: HOSPITAL | Age: 39
End: 2024-06-14
Payer: COMMERCIAL

## 2024-06-14 VITALS
HEART RATE: 73 BPM | DIASTOLIC BLOOD PRESSURE: 78 MMHG | RESPIRATION RATE: 16 BRPM | SYSTOLIC BLOOD PRESSURE: 130 MMHG | WEIGHT: 213 LBS | BODY MASS INDEX: 36.37 KG/M2 | HEIGHT: 64 IN | TEMPERATURE: 98.2 F | OXYGEN SATURATION: 97 %

## 2024-06-14 LAB
ANION GAP SERPL CALCULATED.3IONS-SCNC: 9 MMOL/L (ref 4–13)
AORTIC ROOT: 2.4 CM
APICAL FOUR CHAMBER EJECTION FRACTION: 66 %
ASCENDING AORTA: 2.9 CM
ATRIAL RATE: 67 BPM
B-HCG SERPL-ACNC: 23 MIU/ML (ref 0–5)
BSA FOR ECHO PROCEDURE: 2.01 M2
BUN SERPL-MCNC: 10 MG/DL (ref 5–25)
CALCIUM SERPL-MCNC: 8.7 MG/DL (ref 8.4–10.2)
CHLORIDE SERPL-SCNC: 106 MMOL/L (ref 96–108)
CHOLEST SERPL-MCNC: 176 MG/DL
CO2 SERPL-SCNC: 23 MMOL/L (ref 21–32)
CREAT SERPL-MCNC: 0.84 MG/DL (ref 0.6–1.3)
E WAVE DECELERATION TIME: 172 MS
E/A RATIO: 1.27
ERYTHROCYTE [DISTWIDTH] IN BLOOD BY AUTOMATED COUNT: 11.8 % (ref 11.6–15.1)
EST. AVERAGE GLUCOSE BLD GHB EST-MCNC: 97 MG/DL
FRACTIONAL SHORTENING: 38 (ref 28–44)
GFR SERPL CREATININE-BSD FRML MDRD: 89 ML/MIN/1.73SQ M
GLUCOSE SERPL-MCNC: 101 MG/DL (ref 65–140)
HBA1C MFR BLD: 5 %
HCT VFR BLD AUTO: 37.6 % (ref 34.8–46.1)
HDLC SERPL-MCNC: 52 MG/DL
HGB BLD-MCNC: 12.4 G/DL (ref 11.5–15.4)
INTERVENTRICULAR SEPTUM IN DIASTOLE (PARASTERNAL SHORT AXIS VIEW): 0.9 CM
INTERVENTRICULAR SEPTUM: 0.9 CM (ref 0.6–1.1)
LAAS-AP2: 20.9 CM2
LAAS-AP4: 18.2 CM2
LDLC SERPL CALC-MCNC: 106 MG/DL (ref 0–100)
LEFT ATRIUM SIZE: 3.9 CM
LEFT ATRIUM VOLUME (MOD BIPLANE): 57 ML
LEFT ATRIUM VOLUME INDEX (MOD BIPLANE): 28.4 ML/M2
LEFT INTERNAL DIMENSION IN SYSTOLE: 2.6 CM (ref 2.1–4)
LEFT VENTRICULAR INTERNAL DIMENSION IN DIASTOLE: 4.2 CM (ref 3.5–6)
LEFT VENTRICULAR POSTERIOR WALL IN END DIASTOLE: 1 CM
LEFT VENTRICULAR STROKE VOLUME: 51 ML
LVSV (TEICH): 51 ML
MCH RBC QN AUTO: 30.2 PG (ref 26.8–34.3)
MCHC RBC AUTO-ENTMCNC: 33 G/DL (ref 31.4–37.4)
MCV RBC AUTO: 92 FL (ref 82–98)
MV E'TISSUE VEL-SEP: 10 CM/S
MV PEAK A VEL: 0.75 M/S
MV PEAK E VEL: 95 CM/S
MV STENOSIS PRESSURE HALF TIME: 50 MS
MV VALVE AREA P 1/2 METHOD: 4.4
NONHDLC SERPL-MCNC: 124 MG/DL
P AXIS: 59 DEGREES
PLATELET # BLD AUTO: 297 THOUSANDS/UL (ref 149–390)
PMV BLD AUTO: 10.5 FL (ref 8.9–12.7)
POTASSIUM SERPL-SCNC: 3.9 MMOL/L (ref 3.5–5.3)
PR INTERVAL: 148 MS
QRS AXIS: 43 DEGREES
QRSD INTERVAL: 86 MS
QT INTERVAL: 428 MS
QTC INTERVAL: 452 MS
RA PRESSURE ESTIMATED: 3 MMHG
RBC # BLD AUTO: 4.11 MILLION/UL (ref 3.81–5.12)
RIGHT ATRIUM AREA SYSTOLE A4C: 16.5 CM2
RIGHT VENTRICLE ID DIMENSION: 4 CM
RV PSP: 31 MMHG
SL CV LEFT ATRIUM LENGTH A2C: 5.3 CM
SL CV LV EF: 60
SL CV PED ECHO LEFT VENTRICLE DIASTOLIC VOLUME (MOD BIPLANE) 2D: 76 ML
SL CV PED ECHO LEFT VENTRICLE SYSTOLIC VOLUME (MOD BIPLANE) 2D: 25 ML
SODIUM SERPL-SCNC: 138 MMOL/L (ref 135–147)
T WAVE AXIS: 5 DEGREES
TR MAX PG: 28 MMHG
TR PEAK VELOCITY: 2.7 M/S
TRICUSPID ANNULAR PLANE SYSTOLIC EXCURSION: 2.3 CM
TRICUSPID VALVE PEAK REGURGITATION VELOCITY: 2.65 M/S
TRIGL SERPL-MCNC: 90 MG/DL
VENTRICULAR RATE: 67 BPM
WBC # BLD AUTO: 5.56 THOUSAND/UL (ref 4.31–10.16)

## 2024-06-14 PROCEDURE — 84702 CHORIONIC GONADOTROPIN TEST: CPT | Performed by: STUDENT IN AN ORGANIZED HEALTH CARE EDUCATION/TRAINING PROGRAM

## 2024-06-14 PROCEDURE — 93306 TTE W/DOPPLER COMPLETE: CPT

## 2024-06-14 PROCEDURE — 97166 OT EVAL MOD COMPLEX 45 MIN: CPT

## 2024-06-14 PROCEDURE — 93306 TTE W/DOPPLER COMPLETE: CPT | Performed by: INTERNAL MEDICINE

## 2024-06-14 PROCEDURE — 80061 LIPID PANEL: CPT | Performed by: PSYCHIATRY & NEUROLOGY

## 2024-06-14 PROCEDURE — 97162 PT EVAL MOD COMPLEX 30 MIN: CPT

## 2024-06-14 PROCEDURE — 85027 COMPLETE CBC AUTOMATED: CPT | Performed by: STUDENT IN AN ORGANIZED HEALTH CARE EDUCATION/TRAINING PROGRAM

## 2024-06-14 PROCEDURE — 93010 ELECTROCARDIOGRAM REPORT: CPT | Performed by: INTERNAL MEDICINE

## 2024-06-14 PROCEDURE — 80048 BASIC METABOLIC PNL TOTAL CA: CPT | Performed by: STUDENT IN AN ORGANIZED HEALTH CARE EDUCATION/TRAINING PROGRAM

## 2024-06-14 PROCEDURE — 99239 HOSP IP/OBS DSCHRG MGMT >30: CPT | Performed by: PHYSICIAN ASSISTANT

## 2024-06-14 RX ADMIN — LABETALOL HYDROCHLORIDE 200 MG: 200 TABLET, FILM COATED ORAL at 09:22

## 2024-06-14 RX ADMIN — LEVOTHYROXINE SODIUM 75 MCG: 75 TABLET ORAL at 05:51

## 2024-06-14 RX ADMIN — ASPIRIN 81 MG: 81 TABLET, COATED ORAL at 09:22

## 2024-06-14 RX ADMIN — ACETAMINOPHEN 650 MG: 325 TABLET, FILM COATED ORAL at 09:22

## 2024-06-14 NOTE — DISCHARGE INSTR - AVS FIRST PAGE
A repeat hCG quant or pregnancy hormone level should be taken within 1 week of discharge.  Please contact her infertility specialist/OB to obtain this lab request.    We have provided ambulatory referrals for neurology and cardiology as an outpatient.  [facility] should be calling you to arrange these appointments.  I have provided the numbers for their offices in case you do not receive a phone call to schedule.    At some point, neurology should check a clotting panel on you to see if there is any hereditary disposition for strokes.    You need a Holter monitor/loop recorder completed as an outpt.

## 2024-06-14 NOTE — OCCUPATIONAL THERAPY NOTE
Occupational Therapy Evaluation     Patient Name: Emmett Sharif  Today's Date: 6/14/2024  Problem List  Principal Problem:    Acute ischemic left middle cerebral artery (MCA) stroke (HCC)  Active Problems:    Hypertension    Headache    Threatened miscarriage in early pregnancy    Past Medical History  Past Medical History:   Diagnosis Date    Hypertension      Past Surgical History  History reviewed. No pertinent surgical history.        06/14/24 0849   OT Last Visit   OT Visit Date 06/14/24   Note Type   Note type Evaluation   Pain Assessment   Pain Assessment Tool 0-10   Pain Score No Pain   Restrictions/Precautions   Weight Bearing Precautions Per Order No   Home Living   Type of Home Apartment   Home Layout Two level;1/2 bath on main level;Bed/bath upstairs   Bathroom Shower/Tub Tub/shower unit   Bathroom Toilet Standard   Bathroom Accessibility Accessible   Home Equipment   (none)   Prior Function   Level of [place] Independent with functional mobility;Independent with ADLs;Independent with IADLS   Lives With Spouse   Receives Help From Family   IADLs Independent with driving;Independent with meal prep;Independent with medication management   Falls in the last 6 months 0   Lifestyle   Autonomy pta pt was (I) w ADLS and IADLs, (+) , lives w spouse, no DME or AD   Reciprocal Relationships  and mother   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   Intrinsic Gratification her family   General   Additional Pertinent History Hx of MCA, hypertension, headache, currently pregnant   Family/Caregiver Present Yes   Additional General Comments Pt was pleasant and cooperative   ADL   Where Assessed Edge of bed   Eating Assistance 7  Independent   Grooming Assistance 7  Independent   UB Bathing Assistance 6  Modified Independent   LB Bathing Assistance 6  Modified Independent   UB Dressing Assistance 6  Modified independent   LB Dressing Assistance 6  Modified independent   LB Dressing Deficit  Don/doff R sock;Don/doff L sock   Toileting Assistance  6  Modified independent   Bed Mobility   Supine to Sit 6  Modified independent   Additional items HOB elevated   Sit to Supine 6  Modified independent   Additional items HOB elevated   Additional Comments sat EOB w/ no complaints   Transfers   Sit to Stand 7  Independent   Stand to Sit 7  Independent   Functional Mobility   Functional Mobility 7  Independent   Additional Comments no AD, community distance achieved   Balance   Static Sitting Normal   Dynamic Sitting Normal   Static Standing Good   Dynamic Standing Good   Ambulatory Good   Activity Tolerance   Activity Tolerance Patient tolerated treatment well   Medical Staff Made Aware PT Harika   Nurse Made Aware RN Cathy   RUVIVIANE Assessment   RUE Assessment WFL   LUE Assessment   LUE Assessment WFL   Vision-Basic Assessment   Current Vision No visual deficits   Cognition   Overall Cognitive Status WFL   Arousal/Participation Alert;Cooperative   Attention Within functional limits   Orientation Level Oriented X4   Memory Within functional limits   Following Commands Follows all commands and directions without difficulty   Comments Pt ID via name and  on ID bracelet   Assessment   Limitation Decreased endurance   Prognosis Good   Assessment Pt is a 38 y.o. female seen for OT evaluation s/p admission to [facility] on 2024 due to stroke like symtpoms. Diagnosed with Acute ischemic left middle cerebral artery (MCA) stroke (HCC). Personal and env factors supporting pt at time of IE include age, (I) PLOF, supportive family, and accessible home environment. Personal and env factors inhibiting engagement in occupations include  headaches . Performance deficits that affect the pt’s occupational performance can be seen above. Despite pt's current functional limitations and medical complications pt is functioning at baseline. No further acute OT needs identified at this time. Recommend continued active ADL participation and  mobilization with hospital staff while in the hospital to increase pt’s endurance and strength upon D/C. From OT standpoint, recommend D/C to home with family support when medically cleared. D/C pt from OT caseload at this time.   Goals   Patient Goals to go home   Plan   Treatment Interventions Continued evaluation;Activityengagement   OT Treatment Day 0   OT Frequency Eval only   Discharge Recommendation   Rehab Resource Intensity Level, OT No post-acute rehabilitation needs   Additional Comments  The patient's raw score on the AM-PAC Daily Activity Inpatient Short Form is 22. A raw score of greater than or equal to 19 suggests the patient may benefit from discharge to home. Please refer to the recommendation of the Occupational Therapist for safe discharge planning.   AM-PAC Daily Activity Inpatient   Lower Body Dressing 3   Bathing 3   Toileting 4   Upper Body Dressing 4   Grooming 4   Eating 4   Daily Activity Raw Score 22   Daily Activity Standardized Score (Calc for Raw Score >=11) 47.1   AM-PAC Applied Cognition Inpatient   Following a Speech/Presentation 4   Understanding Ordinary Conversation 4   Taking Medications 4   Remembering Where Things Are Placed or Put Away 4   Remembering List of 4-5 Errands 4   Taking Care of Complicated Tasks 4   Applied Cognition Raw Score 24   Applied Cognition Standardized Score 62.21   End of Consult   Education Provided Yes;Family or social support of family present for education by provider   Patient Position at End of Consult Supine;All needs within reach   Nurse Communication Nurse aware of consult       The patient's raw score on the AM-PAC Daily Activity Inpatient Short Form is 22. A raw score of greater than or equal to 19 suggests the patient may benefit from discharge to home. Please refer to the recommendation of the Occupational Therapist for safe discharge planning.     Nury Soni OTR/L

## 2024-06-14 NOTE — UTILIZATION REVIEW
"Initial Clinical Review    Admission: Date/Time/Statement:   Admission Orders (From admission, onward)       Ordered        06/13/24 1153  Place in Observation  Once                          Orders Placed This Encounter   Procedures    Place in Observation     Standing Status:   Standing     Number of Occurrences:   1     Order Specific Question:   Level of Care     Answer:   Med Surg [16]     ED Arrival Information       Expected   -    Arrival   6/13/2024 06:58    Acuity   Urgent              Means of arrival   Walk-In    Escorted by   Family Member    Service   Hospitalist    Admission type   Emergency              Arrival complaint   severe headache/ poss miscarriage?             Chief Complaint   Patient presents with    Headache     Since yesterday having severe HA unrelieved by 4 tylenol. Pt states at onset of HA was feeling \"fuzzy\" followed by pain on left side of head/eye. Denies N/V, chest pain, SOB, dizziness, and hx of migraines     Threatened Miscarriage     For the last 5 days having intermittent bleeding which has gotten heavier in the last 2 days. Pt has to change pad every few hours. Pt states she has received IVF multiple times and this is first miscarriage. Pt does have hx of HBP and took labetalol this AM (Bps usually 130s)       Initial Presentation: 38 y.o. female to ED via walk-in from home  Present to ED with headache for 36 hours of headache accompanied by some mental fogginess.  She is at 5 weeks gestation. She notes that she developed a severe headache at around 10 AM yesterday 6/12. Tylenol did not seem to help symptoms. Today she woke up and her headache was so bad that she knew she needed to come to the ED. Also of concern, patient has had some vaginal bleeding and her beta-hCG level has been decreasing there is concern that her pregnancy is not viable. OB was consulted and recommended that patient have repeat beta-hCG in the morning.   PMHX:  hypertension   Admitted to OBS with DX: " Acute ischemic left middle cerebral artery (MCA) stroke   on exam: hypertensive; B-HCG 28.1.   MRI which unfortunately appears concerning for acute temporal/occipital CVA.   PLAN: neuro checks; tele monitoring; monitor labs; f/u repeat beta-hCG; monitor / trend BP's; OBGYN consulted; cont Labetalol 200mg po q12h; f/u echo; neuro consult      Anticipated Length of Stay/Certification Statement: Patient will be admitted on an observation basis with an anticipated length of stay of less than 2 midnights secondary to CVA.       NEURO CONSULT  Acute ischemic left middle cerebral artery (MCA) stroke (HCC) / Assessment & Plan  Suspect associated with coagulability effects of hormone changes in/around pregnancy.    Recommend echocardiogram with bubble study. Outpatient cardiology evaluation for possible loop recorder placement. thrombosis panel to be drawn in 6 to 8 weeks. If okay with OB and hemoglobin is stable, recommend aspirin 81 mg daily for the next 2 to 3 months. Would also recommend aspirin 81 mg daily throughout the course of any future pregnancies or hormone therapies associated with IVF. Check A1c and FLP.-Will keep overnight for observation.      ED Triage Vitals [06/13/24 0709]   Temperature Pulse Respirations Blood Pressure SpO2 Pain Score   98 °F (36.7 °C) 62 17 (!) 181/95 98 % 10 - Worst Possible Pain     Weight (last 2 days)       Date/Time Weight    06/13/24 1337 96.6 (213)            Vital Signs (last 3 days)       Date/Time Temp Pulse Resp BP MAP (mmHg) SpO2 O2 Device Patient Position - Orthostatic VS [place] Coma Scale Score Pain    06/14/24 0922 -- -- -- -- -- -- -- -- -- 5    06/14/24 0848 -- -- -- -- -- -- -- -- -- No Pain    06/14/24 0830 -- -- -- -- -- -- -- -- 15 --    06/14/24 0747 98.1 °F (36.7 °C) 64 20 157/87 -- 100 % None (Room air) Lying -- --    06/14/24 0430 -- -- -- -- -- -- -- -- 15 --    06/14/24 0249 97.6 °F (36.4 °C) 63 18 130/72 -- 100 % None (Room air) Lying -- --    06/14/24 0030  97.8 °F (36.6 °C) 70 18 128/70 -- 99 % None (Room air) Lying 15 No Pain    06/13/24 2230 -- -- -- -- -- -- -- -- 15 --    06/13/24 2222 97.6 °F (36.4 °C) 68 18 118/66 -- 99 % None (Room air) Lying -- --    06/13/24 2030 97.8 °F (36.6 °C) 70 18 134/80 -- 99 % None (Room air) Sitting 15 --    06/13/24 1829 -- 72 18 135/83 105 98 % None (Room air) Sitting 15 --    06/13/24 1633 97.5 °F (36.4 °C) 69 18 131/80 97 97 % None (Room air) Sitting 15 --    06/13/24 1530 97.9 °F (36.6 °C) 79 18 129/87 -- 99 % -- Sitting 15 --    06/13/24 1435 97.9 °F (36.6 °C) 64 17 150/87 122 95 % None (Room air) Lying 15 --    06/13/24 1422 -- -- -- -- -- -- None (Room air) -- 15 3    06/13/24 1344 -- -- -- -- -- -- -- -- -- 3    06/13/24 1337 98.5 °F (36.9 °C) 70 16 134/92 -- 97 % None (Room air) Lying 15 --    06/13/24 1100 -- 78 14 188/85 119 98 % -- -- -- --    06/13/24 0807 -- 79 13 137/73 101 100 % None (Room air) -- -- --    06/13/24 0755 -- -- -- -- -- -- -- -- 15 --    06/13/24 0709 98 °F (36.7 °C) 62 17 181/95 128 98 % None (Room air) -- -- 10 - Worst Possible Pain              Pertinent Labs/Diagnostic Test Results:   Radiology:  MRV head wo contrast   Final Interpretation by Joni Gandhi DO (06/13 1039)      No evidence of dural venous sinus thrombosis.               Workstation performed: [id number]         MRI brain wo contrast   Final Interpretation by Joni Gandhi DO (06/13 1041)      Cortical diffusion abnormality in the left temporal lobe with some involvement of the left lateral temporal occipital periventricular white matter compatible with recent infarct. Matching defects are identified on the FLAIR sequence. No hemorrhage    identified.      I personally discussed this study with BECK HOOVER on 6/13/2024 10:40 AM.            Workstation performed: [id number]          OB pregnancy limited with transvaginal   Final Interpretation by Alirio Best MD (06/13 0922)      No intrauterine  gestation or adnexal mass.      Differential remains early IUP, spontaneous  and ectopic pregnancy.  Correlate with serial quantitative BHCG.            Workstation performed: [id number]         CTA head and neck with and without contrast   Final Interpretation by Joni Gandhi DO (835)      Loss of gray-white differentiation left superior temporal gyrus. Differential considerations include recent infarct with either arterial or venous etiologies to be considered. Less likely etiologies to include encephalitis or neoplastic processes.      Mild atheromatous plaque identified at the left carotid bifurcation. Correlate for cardiovascular risk factors.      No significant carotid or vertebral artery stenosis.                     I personally discussed this study with BECK HOOVER on 2024 8:31 AM.                        Workstation performed: [id number]             Results from last 7 days   Lab Units 24  0545 24  0727   WBC Thousand/uL 5.56 5.40   HEMOGLOBIN g/dL 12.4 12.3   HEMATOCRIT % 37.6 37.8   PLATELETS Thousands/uL 297 315   TOTAL NEUT ABS Thousands/µL  --  2.88        Results from last 7 days   Lab Units 24  0545 24  0727   SODIUM mmol/L 138 138   POTASSIUM mmol/L 3.9 3.9   CHLORIDE mmol/L 106 106   CO2 mmol/L 23 26   ANION GAP mmol/L 9 6   BUN mg/dL 10 10   CREATININE mg/dL 0.84 0.95   EGFR ml/min/1.73sq m 89 77   CALCIUM mg/dL 8.7 8.8     Results from last 7 days   Lab Units 24  0727   AST U/L 17   ALT U/L 23   ALK PHOS U/L 46   TOTAL PROTEIN g/dL 6.9   ALBUMIN g/dL 3.7   TOTAL BILIRUBIN mg/dL 0.53        Results from last 7 days   Lab Units 24  0545 24  0727   GLUCOSE RANDOM mg/dL 101 104        Results from last 7 days   Lab Units 24  0727   HEMOGLOBIN A1C % 5.0   EAG mg/dl 97        Results from last 7 days   Lab Units 24  0727   PROTIME seconds 13.5   INR  1.04   PTT seconds 27        Results from last 7 days   Lab Units  06/13/24  0744   CLARITY UA  Clear   COLOR UA  Yellow   SPEC GRAV UA  1.020   PH UA  6.0   GLUCOSE UA mg/dl Negative   KETONES UA mg/dl Negative   BLOOD UA  3+*   PROTEIN UA mg/dl Trace*   NITRITE UA  Negative   BILIRUBIN UA  Negative   UROBILINOGEN UA E.U./dl 0.2   LEUKOCYTES UA  1+*   WBC UA /hpf 10-20*   RBC UA /hpf 20-30*   BACTERIA UA /hpf Occasional   EPITHELIAL CELLS WET PREP /hpf Moderate*   MUCUS THREADS  Occasional*          ED Treatment-Medication Administration from 06/13/2024 0658 to 06/13/2024 1333         Date/Time Order Dose Route Action     06/13/2024 0744 metoclopramide (REGLAN) injection 10 mg 10 mg Intravenous Given     06/13/2024 0744 diphenhydrAMINE (BENADRYL) injection 25 mg 25 mg Intravenous Given     06/13/2024 0745 sodium chloride 0.9 % bolus 1,000 mL 1,000 mL Intravenous New Bag     06/13/2024 0818 iohexol (OMNIPAQUE) 350 MG/ML injection (SINGLE-DOSE) 85 mL 85 mL Intravenous Given              Admitting Diagnosis: CVA (cerebral vascular accident) ([facility]) [I63.9]  Vaginal bleeding in pregnancy [O46.90]  Headache [R51.9]    Age/Sex: 38 y.o. female    Admission Orders: SCDs; I/O; neuro checks; tele monitoring;     Scheduled Medications:  aspirin, 81 mg, Oral, Daily  heparin (porcine), 5,000 Units, Subcutaneous, Q8H LAURIE  labetalol, 200 mg, Oral, Q12H LAURIE  levothyroxine, 75 mcg, Oral, Early Morning      Continuous IV Infusions: None       PRN Meds:  acetaminophen, 650 mg, Oral, Q6H PRN  (6/13 recd x1)   (6/14 recd x1)         IP CONSULT TO NEUROLOGY  IP CONSULT TO CASE MANAGEMENT  IP CONSULT TO NUTRITION SERVICES  IP CONSULT TO OB GYN    Network Utilization Review Department  ATTENTION: Please call with any questions or concerns to [phone] and carefully listen to the prompts so that you are directed to the right person. All voicemails are confidential.   For Discharge needs, contact Care Management DC Support Team at [phone] opt. [phone]  Send all requests for admission clinical reviews,  approved or denied determinations and any other requests to dedicated fax number below belonging to the campus where the patient is receiving treatment. List of dedicated fax numbers for the Facilities:  FACILITY NAME UR FAX NUMBER   ADMISSION DENIALS (Administrative/Medical Necessity) [phone]   DISCHARGE SUPPORT TEAM (NETWORK) [phone]   PARENT CHILD HEALTH (Maternity/NICU/Pediatrics) [phone]   [facility] [phone]   [facility] [phone]   [facility] [phone]   [facility] [phone]   [facility] [phone]   [facility] [phone]   [facility] [phone]   [facility] [phone]   [facility] [phone]   [facility] [phone]   [facility] [phone]   [facility] [phone]

## 2024-06-14 NOTE — CASE MANAGEMENT
Case Management Assessment & Discharge Planning Note    Patient name Emmett Sharif  Location /-01 MRN [id number]  : 1985 Date 2024       Current Admission Date: 2024  Current Admission Diagnosis:Acute ischemic left middle cerebral artery (MCA) stroke (HCC)   Patient Active Problem List    Diagnosis Date Noted Date Diagnosed    Acute ischemic left middle cerebral artery (MCA) stroke (HCC) 2024     Hypertension 2024     Headache 2024     Threatened miscarriage in early pregnancy 2024       LOS (days): 0  Geometric Mean LOS (GMLOS) (days):   Days to GMLOS:     OBJECTIVE:           Current admission status: Observation  Referral Reason: Stroke    Preferred Pharmacy:   [id number] - [place] - [address]  [address] [place] [address]  Phone: [phone] Fax: [phone]    Primary Care Provider: Elias Diane    Primary Insurance: [employer]  Secondary Insurance: [employer] [facility]    ASSESSMENT:  Active Health Care Proxies    There are no active Health Care Proxies on file.       Readmission Root Cause  30 Day Readmission: No    Patient Information  Admitted from:: Home  Mental Status: Alert  During Assessment patient was accompanied by: Not accompanied during assessment  Assessment information provided by:: Patient  Primary Caregiver: Self  Support Systems: Family members, Spouse/significant other  County of Residence: [place]  What city do you live in?: [place]  Home entry access options. Select all that apply.: Stairs  Number of steps to enter home.: 2  Do the steps have railings?: No  Type of Current Residence: 2 story home  Upon entering residence, is there a bedroom on the main floor (no further steps)?: No  A bedroom is located on the following floor levels of residence (select all that apply):: 2nd Floor  Upon entering residence, is there a bathroom on the main floor (no further steps)?: Yes (1/2 bath)  Number of steps to 2nd  floor from main floor: One Flight  Is patient a ?: No    Activities of Daily Living Prior to Admission  Functional Status: Independent  Completes ADLs independently?: Yes  Ambulates independently?: Yes  Does patient use assisted devices?: No  Does patient currently own DME?: No  Does patient have a history of Outpatient Therapy (PT/OT)?: No  Does the patient have a history of Short-Term Rehab?: No  Does patient have a history of HHC?: No  Does patient currently have HHC?: No    Patient Information Continued  Income Source: Employed  Does patient have prescription coverage?: Yes  Does patient receive dialysis treatments?: No  Does patient have a history of substance abuse?: No  Does patient have a history of Mental Health Diagnosis?: No    Means of Transportation  Means of Transport to Appts:: Drives Self      Social Determinants of Health (SDOH)      Flowsheet Row Most Recent Value   Housing Stability    In the last 12 months, was there a time when you were not able to pay the mortgage or rent on time? N   In the past 12 months, how many times have you moved where you were living? 1   At any time in the past 12 months, were you homeless or living in a shelter (including now)? N   Transportation Needs    In the past 12 months, has lack of transportation kept you from medical appointments or from getting medications? no   In the past 12 months, has lack of transportation kept you from meetings, work, or from getting things needed for daily living? No   Food Insecurity    Within the past 12 months, you worried that your food would run out before you got the money to buy more. Never true   Within the past 12 months, the food you bought just didn't last and you didn't have money to get more. Never true   Utilities    In the past 12 months has the electric, gas, oil, or water company threatened to shut off services in your home? No            DISCHARGE DETAILS:    Discharge planning discussed with::  Patient  Freedom of Choice: Yes  Comments - Freedom of Choice: CM met with patient and family to introduce role and begin discharge planning. Patient is choosing to return home and denies any needs. CM will follow.  CM contacted family/caregiver?: Yes  Were Treatment Team discharge recommendations reviewed with patient/caregiver?: Yes  Did patient/caregiver verbalize understanding of patient care needs?: Yes  Were patient/caregiver advised of the risks associated with not following Treatment Team discharge recommendations?: Yes    Contacts  Patient Contacts: Ashok Jay  Relationship to Patient:: Family  Contact Method: In Person    Requested Home Health Care         Is the patient interested in HHC at discharge?: No    DME Referral Provided  Referral made for DME?: No    Other Referral/Resources/Interventions Provided:  Interventions: None Indicated    Would you like to participate in our [facility] service program?  : No - Declined    Treatment Team Recommendation: Home  Discharge Destination Plan:: Home  Transport at Discharge : Family

## 2024-06-14 NOTE — ASSESSMENT & PLAN NOTE
38-year-old female who is at 5 weeks gestation who presented w/ a moderate headache & accelerated HTN.   MRI of brain: Left temporal lobe CVA  MRV: dural venous sinus thrombosis   CTA: No significant carotid or vertebral artery stenosis   Neurology and OB/GYN were consulted  Case reviewed with OB/GYN via [employer] secure chat on 06/14  Case reviewed with neurology via [employer] secure chat on 06/14  ECHO: ???  No events on tele   Continue aspirin 81 mg daily for 2 to 3 months  No need for statin at discharge  Ambulatory referrals given for neurology; outpatient thrombus panel within 6 to 8 weeks  Ambulatory referral given for cardiology; patient will need loop recorder/Holter monitor  Etiology of CVA likely related to hormonal aberrations during pregnancy

## 2024-06-14 NOTE — DISCHARGE SUMMARY
"[facility]  Discharge- Emmett Sharif 1985, 38 y.o. female MRN: [id number]  Unit/Bed#: -Lanny Encounter: [id number]  Primary Care Provider: Elias Diane   Date and time admitted to hospital: 2024  7:01 AM    Acute ischemic left middle cerebral artery (MCA) stroke (HCC)  Assessment & Plan  38-year-old female who is at 5 weeks gestation who presented w/ a moderate headache & accelerated HTN.   MRI of brain: Left temporal lobe CVA  MRV: dural venous sinus thrombosis   CTA: No significant carotid or vertebral artery stenosis   Neurology and OB/GYN were consulted  Case reviewed with OB/GYN via [employer] secure chat on   Case reviewed with neurology via [employer] chat on   ECHO: No PFO    No events on tele   Continue aspirin 81 mg daily for 2 to 3 months  No need for statin at discharge  Ambulatory referrals given for neurology; outpatient thrombus panel within 6 to 8 weeks  Ambulatory referral given for cardiology; patient will need loop recorder/Holter monitor  Etiology of CVA likely related to hormonal aberrations during pregnancy     Threatened miscarriage in early pregnancy  Assessment & Plan  5 weeks gestation and presents for vaginal bleeding and decreasing beta-hCG; bleeding now described as spotting without clots passage  B-HCG trended down since admission; patient was having routine hCG lab monitoring as an outpatient  150 -> 40 -> 35 -> 28 -> 23   Transvaginal U/S: \"No intrauterine gestation or adnexal mass. Differential remains early IUP, spontaneous  and ectopic pregnancy. Correlate with serial quantitative BHCG.\"  OBGYN consulted and case reviewed via epic chat  Outpatient repeat B-hCG within 1 week of discharge; patient notes she already has an outpatient order for Monday  Patient may need to be on aspirin on a chronic basis if she is sexually active and not on birth control: Deferred to outpatient neurology/OB    Hypertension  Assessment & Plan  H/o " hypertension, states she used to be on amlodipine but is maintained on labetalol in the outpatient setting  Denies known history of preeclampsia  Resume Labetalol 200mg po q12h at d/c; BP well controlled     Headache  Assessment & Plan  Patient presenting for 36 hours of headache accompanied by some mental fogginess.  Found to have acute MCA CVA on MRI  HA mild at d/c and reduce with Tylenol    Medical Problems       Resolved Problems  Date Reviewed: 6/13/2024   None       Discharging Physician / Practitioner: Radha Abreu PA-C  PCP: Elias Diane  Admission Date:   Admission Orders (From admission, onward)       Ordered        06/13/24 1153  Place in Observation  Once                          Discharge Date: 06/14/24    Consultations During Hospital Stay:  GYN/OB - taylor  Neurology     Procedures Performed:   None    Significant Findings / Test Results:   Please see A/P     Incidental Findings:   None     Test Results Pending at Discharge (will require follow up):   None      Outpatient Tests Requested:  Repeat HCG w/in 1 week of d/c   Outpatient thrombus panel to be deferred to neurology    Complications:  None    Reason for Admission: CVA     Hospital Course:   Emmett Sharif is a 38 y.o. female patient who originally presented to the hospital on 6/13/2024 due to acute CVA.  Patient presented to the emergency department after approximately 36 hours of a moderate to severe headache.  Patient was noted to be 5 weeks gestation and was having some vaginal bleeding and occasional clotting.  MRI of brain showed a left temporal lobe CVA.  Neurology completed a telemedicine visit in the emergency department.  GYN/OB approved the patient to be on aspirin 81 mg daily for at least 2 to 3 months.  Neurology did not deem a statin was necessary at discharge.  Patient needs to follow-up as an outpatient in 6 to 8 weeks for a thrombus panel with neurology.  Patient needs to follow-up with cardiology for a Holter monitor/loop  "recorder.    Unfortunately, patient's ultrasound was consistent for no intrauterine gestation.  With trending hCG, patient is likely having a spontaneous .  Patient needs a repeat beta-hCG within 1 week of discharge.  She notes she has blood work already scheduled for Monday.  Patient is only having some vaginal spotting; she is without clot passage today.    Please see above list of diagnoses and related plan for additional information.     Condition at Discharge: good    Discharge Day Visit / Exam:   Subjective: Patient has no specific complaints.  She notes her headache is 3/10.  She denies dysarthria, dysphagia, gait/ambulation discoordination, dizziness, lightheadedness, visual does changes and neurofocal deficits.  She is tolerating p.o. intake.  She is having some vaginal spotting without clot passage.  She denies dysuria.  Vitals: Blood Pressure: 157/87 (24 07)  Pulse: 64 (24 07)  Temperature: 98.1 °F (36.7 °C) (24 07)  Temp Source: Oral (24)  Respirations: 20 (24 07)  Height: 5' 4\" (162.6 cm) (24 1337)  Weight - Scale: 96.6 kg (213 lb) (24 1337)  SpO2: 100 % (24 07)  Exam:   Physical Exam  Constitutional:       General: She is not in acute distress.     Appearance: She is obese. She is not toxic-appearing.   HENT:      Head: Normocephalic.      Right Ear: External ear normal.      Left Ear: External ear normal.      Nose: Nose normal.      Mouth/Throat:      Mouth: Mucous membranes are moist.      Pharynx: Oropharynx is clear.   Eyes:      Extraocular Movements: Extraocular movements intact.      Conjunctiva/sclera: Conjunctivae normal.      Pupils: Pupils are equal, round, and reactive to light.   Cardiovascular:      Rate and Rhythm: Normal rate and regular rhythm.      Pulses: Normal pulses.      Heart sounds: Normal heart sounds.   Pulmonary:      Effort: Pulmonary effort is normal.      Breath sounds: Normal breath sounds. "   Abdominal:      General: Abdomen is flat. Bowel sounds are normal. There is no distension.      Palpations: Abdomen is soft. There is no mass.      Tenderness: There is no abdominal tenderness. There is no guarding or rebound.   Musculoskeletal:         General: No swelling. Normal range of motion.   Skin:     General: Skin is warm and dry.   Neurological:      General: No focal deficit present.      Mental Status: She is alert and oriented to person, place, and time. Mental status is at baseline.      Cranial Nerves: No cranial nerve deficit.      Sensory: No sensory deficit.      Motor: No weakness.      Coordination: Coordination normal.      Gait: Gait normal.   Psychiatric:         Mood and Affect: Mood normal.         Behavior: Behavior normal.       Discussion with Family: Updated  (mom & ) at bedside.    Discharge instructions/Information to patient and family:   See after visit summary for information provided to patient and family.      Provisions for Follow-Up Care:  See after visit summary for information related to follow-up care and any pertinent home health orders.      Mobility at time of Discharge:   Basic Mobility Inpatient Raw Score: 24  JH-HLM Goal: 8: Walk 250 feet or more  JH-HLM Achieved: 8: Walk 250 feet ot more  HLM Goal achieved. Continue to encourage appropriate mobility.     Disposition:   Home    Planned Readmission: None     Discharge Statement:  I spent 75 minutes discharging the patient. This time was spent on the day of discharge. I had direct contact with the patient on the day of discharge. Greater than 50% of the total time was spent examining patient, answering all patient questions, arranging and discussing plan of care with patient as well as directly providing post-discharge instructions.  Additional time then spent on discharge activities.    Discharge Medications:  See after visit summary for reconciled discharge medications provided to patient and/or  family.      **Please Note: This note may have been constructed using a voice recognition system**

## 2024-06-14 NOTE — ASSESSMENT & PLAN NOTE
"5 weeks gestation and presents for vaginal bleeding and decreasing beta-hCG  B-HCG trended down since admission; patient was having routine hCG lab monitoring as an outpatient  150 -> 40 -> 35 -> 28 -> 23   Transvaginal U/S: \"No intrauterine gestation or adnexal mass. Differential remains early IUP, spontaneous  and ectopic pregnancy. Correlate with serial quantitative BHCG.\"  OBGYN consulted and case reviewed via epic chat  Outpatient repeat B-hCG within 1 week of discharge; patient notes she already has an outpatient order for Monday  Patient may need to be on aspirin on a chronic basis if she is sexually active and not on birth control: Deferred to outpatient neurology/OB  "

## 2024-06-14 NOTE — ASSESSMENT & PLAN NOTE
H/o hypertension, states she used to be on amlodipine but is maintained on labetalol in the outpatient setting  Denies known history of preeclampsia  Resume Labetalol 200mg po q12h at d/c; BP well controlled

## 2024-06-14 NOTE — PLAN OF CARE
Problem: Potential for Falls  Goal: Patient will remain free of falls  Description: INTERVENTIONS:  - Educate patient/family on patient safety including physical limitations  - Instruct patient to call for assistance with activity   - Consult OT/PT to assist with strengthening/mobility   - Keep Call bell within reach  - Keep bed low and locked with side rails adjusted as appropriate  - Keep care items and personal belongings within reach  - Initiate and maintain comfort rounds  - Make Fall Risk Sign visible to staff  - Apply yellow socks and bracelet for high fall risk patients  - Consider moving patient to room near nurses station  Outcome: Progressing     Problem: Neurological Deficit  Goal: Neurological status is stable or improving  Description: Interventions:  - Monitor and assess patient's level of consciousness, motor function, sensory function, and level of assistance needed for ADLs.   - Monitor and report changes from baseline. Collaborate with interdisciplinary team to initiate plan and implement interventions as ordered.   - Provide and maintain a safe environment.  - Consider seizure precautions.  - Consider fall precautions.  - Consider aspiration precautions.  - Consider bleeding precautions.  Outcome: Progressing

## 2024-06-14 NOTE — ASSESSMENT & PLAN NOTE
Patient presenting for 36 hours of headache accompanied by some mental fogginess.  Found to have acute MCA CVA on MRI  HA mild at d/c and reduce with Tylenol

## 2024-06-14 NOTE — PHYSICAL THERAPY NOTE
PHYSICAL THERAPY EVALUATION NOTE          Patient Name: Emmett Sharif  Today's Date: 2024        AGE:   38 y.o.  Mrn:   [id number]  ADMIT DX:  CVA (cerebral vascular accident) (HCC) [I63.9]  Vaginal bleeding in pregnancy [O46.90]  Headache [R51.9]    Past Medical History:  Past Medical History:   Diagnosis Date    Hypertension        Past Surgical History:  History reviewed. No pertinent surgical history.  Length Of Stay: 0        PHYSICAL THERAPY EVALUATION:    Patient's identity confirmed via 2 patient identifiers (full name and ) at start of session       24 0848   PT Last Visit   PT Visit Date 24   Note Type   Note type Evaluation   Pain Assessment   Pain Assessment Tool 0-10   Pain Score No Pain   Restrictions/Precautions   Weight Bearing Precautions Per Order No   Home Living   Type of Home House   Home Layout Two level;1/2 bath on main level;Bed/bath upstairs  (1-2 KHADIJAH, full flight of stairs w/ railing to 2nd floor bedroom/full bath)   Bathroom Shower/Tub Tub/shower unit   Home Equipment   (none)   Prior Function   Level of [place] Independent with functional mobility;Independent with ADLs;Independent with IADLS   Lives With Spouse   Receives Help From Family   IADLs Independent with driving;Independent with meal prep;Independent with medication management   Falls in the last 6 months 0   Comments At baseline pt amb ind w/o AD   General   Additional Pertinent History Neurology: acute ischemic L MCA stroke - MRI: embolic appearing acute to subacute infarct in the L MCA territory per Neurology   Family/Caregiver Present Yes  (family, )   Cognition   Overall Cognitive Status WFL   Arousal/Participation Alert   Orientation Level Oriented X4   Memory Within functional limits   Following Commands Follows all commands and directions without difficulty   Comments Pt ID via name and ; pt agreeable to PT eval and mobility. Pt very  pleasant and motivated throughout, expresses anxiety w/ being in the hospital and the desire to be up and moving   RLE Assessment   RLE Assessment WFL   LLE Assessment   LLE Assessment WFL   Vision-Basic Assessment   Current Vision No visual deficits   Coordination   Movements are Fluid and Coordinated 1   Light Touch   RLE Light Touch Grossly intact   LLE Light Touch Grossly intact   Bed Mobility   Supine to Sit 6  Modified independent   Additional items HOB elevated   Sit to Supine 6  Modified independent   Additional items HOB elevated   Additional Comments able to maintain sitting balance at EOB ind, denies lightheadedness/dizziness throughout mobility   Transfers   Sit to Stand 7  Independent   Stand to Sit 7  Independent   Ambulation/Elevation   Gait pattern Decreased foot clearance   Gait Assistance 7  Independent   Assistive Device None   Distance 260'   Stair Management Assistance 6  Modified independent   Stair Management Technique One rail L;Alternating pattern  (no rails vs unilateral railing)   Number of Stairs 18   Ambulation/Elevation Additional Comments demonstrated ability to perform multiple turns, negotiate obstacles w/o evidence of LOB. pt declines feeling off balance, unsteady   Balance   Static Sitting Normal   Dynamic Sitting Normal   Static Standing Good   Dynamic Standing Good   Ambulatory Good   Activity Tolerance   Activity Tolerance Patient tolerated treatment well   Medical Staff Made Aware OT Deborah   Nurse Made Aware LINDA Morgan   Assessment   Prognosis Good   Assessment Emmett Sharif is a 38 y.o. Female who presents [facility]13[facility] due to headache, threatened miscarriage. Orders for PT eval and treat received, w/ activity orders of up and OOB as tolerated. Comorbidities affecting pt at time of eval include: HTN, Neurology: acute ischemic L MCA stroke. Personal factors affecting pt DC include: lives in 2 story house and stairs to enter home. At baseline, pt mobilizes independently w/  no, w/ 0 falls in the last 6 months. Upon evaluation, pt does not present w/ mobility deficits. Upon eval, is currently performing  mod I for bed mobility, ind for transfers, ind w/ no AD for ambulation, ind w/ LUE vs no UE support for stair negotiation. Pt's clinical presentation is evolving due to abnormal lab values, ongoing medical management. From PT/mobility standpoint, pt appears to be functioning close to or at mobility baseline, therefore no further immediate skilled PT needs are warranted at this time and pt will be DC from IPPT caseload. When medically cleared for DC, no PT/rehab needs. Please re-consult if skilled PT needs are warranted.   Goals   Patient Goals to go home   PT Treatment Day 0   Plan   Treatment/Interventions   (DC IPPT)   Discharge Recommendation   Rehab Resource Intensity Level, PT No post-acute rehabilitation needs   AM-PAC Basic Mobility Inpatient   Turning in Flat Bed Without Bedrails 4   Lying on Back to Sitting on Edge of Flat Bed Without Bedrails 4   Moving Bed to Chair 4   Standing Up From Chair Using Arms 4   Walk in Room 4   Climb 3-5 Stairs With Railing 4   Basic Mobility Inpatient Raw Score 24   Basic Mobility Standardized Score 57.68   [facility] Highest Level Of Mobility   -HL Goal 8: Walk 250 feet or more   -HLM Achieved 8: Walk 250 feet ot more   End of Consult   Patient Position at End of Consult Supine;All needs within reach       The patient's AM-PAC Basic Mobility Inpatient Short Form Raw Score is 24. A Raw score of greater than 16 suggests the patient may benefit from discharge to home. Please also refer to the recommendation of the Physical Therapist for safe discharge planning.    Given the above findings at time of evaluation, pt appears to be functioning close to or at mobility baseline and does not require skilled inpatient PT. Will D/C patient from PT caseload, please re-consult if any changes or needs arise.         DC rec: no PT needs        Harika  Joon, PT, DPT  06/14/24

## 2024-08-22 ENCOUNTER — OFFICE VISIT (OUTPATIENT)
Dept: OBGYN CLINIC | Facility: CLINIC | Age: 39
End: 2024-08-22
Payer: COMMERCIAL

## 2024-08-22 ENCOUNTER — TELEPHONE (OUTPATIENT)
Age: 39
End: 2024-08-22

## 2024-08-22 ENCOUNTER — APPOINTMENT (OUTPATIENT)
Dept: LAB | Facility: AMBULARY SURGERY CENTER | Age: 39
End: 2024-08-22
Payer: COMMERCIAL

## 2024-08-22 VITALS
DIASTOLIC BLOOD PRESSURE: 82 MMHG | SYSTOLIC BLOOD PRESSURE: 134 MMHG | WEIGHT: 199 LBS | BODY MASS INDEX: 33.97 KG/M2 | HEIGHT: 64 IN

## 2024-08-22 DIAGNOSIS — I63.9 CVA (CEREBRAL VASCULAR ACCIDENT) (HCC): ICD-10-CM

## 2024-08-22 DIAGNOSIS — N92.6 IRREGULAR MENSES: Primary | ICD-10-CM

## 2024-08-22 DIAGNOSIS — N92.6 IRREGULAR MENSES: ICD-10-CM

## 2024-08-22 DIAGNOSIS — I10 PRIMARY HYPERTENSION: ICD-10-CM

## 2024-08-22 DIAGNOSIS — I63.512 ACUTE ISCHEMIC LEFT MIDDLE CEREBRAL ARTERY (MCA) STROKE (HCC): ICD-10-CM

## 2024-08-22 LAB
ABO GROUP BLD: NORMAL
B-HCG SERPL-ACNC: <0.6 MIU/ML (ref 0–5)
BLD GP AB SCN SERPL QL: NEGATIVE
RH BLD: POSITIVE
SPECIMEN EXPIRATION DATE: NORMAL
TSH SERPL DL<=0.05 MIU/L-ACNC: 1.8 UIU/ML (ref 0.45–4.5)

## 2024-08-22 PROCEDURE — 99214 OFFICE O/P EST MOD 30 MIN: CPT | Performed by: PHYSICIAN ASSISTANT

## 2024-08-22 PROCEDURE — 36415 COLL VENOUS BLD VENIPUNCTURE: CPT

## 2024-08-22 PROCEDURE — 86901 BLOOD TYPING SEROLOGIC RH(D): CPT

## 2024-08-22 PROCEDURE — 84443 ASSAY THYROID STIM HORMONE: CPT

## 2024-08-22 PROCEDURE — 86850 RBC ANTIBODY SCREEN: CPT

## 2024-08-22 PROCEDURE — 86900 BLOOD TYPING SEROLOGIC ABO: CPT

## 2024-08-22 PROCEDURE — 84702 CHORIONIC GONADOTROPIN TEST: CPT

## 2024-08-22 RX ORDER — HYDROCHLOROTHIAZIDE 25 MG/1
TABLET ORAL
COMMUNITY
Start: 2024-06-20

## 2024-08-22 RX ORDER — AMLODIPINE BESYLATE 5 MG/1
TABLET ORAL
COMMUNITY
End: 2024-08-29 | Stop reason: ALTCHOICE

## 2024-08-22 NOTE — TELEPHONE ENCOUNTER
Pt called to sched per [facility] referral for preconception coun. Referral is still pending approval informed pt will get a call when appt is ready to be sched.

## 2024-08-22 NOTE — PROGRESS NOTES
"Assessment/Plan:      Diagnoses and all orders for this visit:    Irregular menses  -     TSH, 3rd generation with Free T4 reflex; Future  -     hCG, quantitative; Future  -     Type and screen; Future  -     Ambulatory Referral to Maternal Fetal Medicine; Future    Primary hypertension  -     Ambulatory Referral to Maternal Fetal Medicine; Future    Acute ischemic left middle cerebral artery (MCA) stroke (HCC)  -     Ambulatory Referral to Maternal Fetal Medicine; Future    CVA (cerebral vascular accident) (HCC)  -     Ambulatory Referral to Obstetrics / Gynecology  -     Ambulatory Referral to Maternal Fetal Medicine; Future    Other orders  -     amLODIPine (NORVASC) 5 mg tablet; Take 1 tablet every day by oral route. (Patient not taking: Reported on 8/22/2024)  -     hydroCHLOROthiazide 25 mg tablet          Subjective:     Patient ID: Emmett Sharif is a 38 y.o. female.    Pt presents as a new patient discussion today  She experienced a SP AB in 6/24 in [place], as well as a CVA--  She wanted to transfer care here and hopefully conceive in near future  She historically had been seeing RMA with her  and went through 2 rounds of IVF without success  She then conceived spontaneously in May 2024  She started bleeding and cramping ~ 5 weeks  Pt was also having severe HA, and MRI showed left temporal cva--currently on ASA  Pt also has h/o HTN--has been well controlled on rx  She is currently feeling well  Her quants did go down to zero ~ 7-5-24  She has had 2 \"periods\"  She still needs to follow up with Cardiology and Neurology  I also put referral in for MFM so pt and her partner can get all of their ducks in a row  Advised pt to cont pnv and asa and all other meds for now  She used Semaglutide for a few weeks in the Spring but is no longer interested in doing that  Is eating health and exercising  Pt is a nonsmoker        Review of Systems   Constitutional:  Negative for chills, fever and unexpected weight " change.   HENT:  Negative for ear pain and sore throat.    Eyes:  Negative for pain and visual disturbance.   Respiratory:  Negative for cough and shortness of breath.    Cardiovascular:  Negative for chest pain and palpitations.   Gastrointestinal:  Negative for abdominal pain, blood in stool, constipation, diarrhea and vomiting.   Genitourinary: Negative.  Negative for dysuria and hematuria.   Musculoskeletal:  Negative for arthralgias and back pain.   Skin:  Negative for color change and rash.   Neurological:  Negative for seizures and syncope.   All other systems reviewed and are negative.        Objective:     Physical Exam  Vitals and nursing note reviewed.   Constitutional:       Appearance: Normal appearance.   Neurological:      Mental Status: She is alert.

## 2024-08-27 ENCOUNTER — TELEPHONE (OUTPATIENT)
Age: 39
End: 2024-08-27

## 2024-08-27 NOTE — TELEPHONE ENCOUNTER
JOSEFINA Erickson Maternal Fetal Med Pod Clerical  Please schedule a VV or face to face preconception visit with [facility] physician.  Thanks,  Alesia

## 2024-08-29 ENCOUNTER — CONSULT (OUTPATIENT)
Dept: CARDIOLOGY CLINIC | Facility: CLINIC | Age: 39
End: 2024-08-29
Payer: COMMERCIAL

## 2024-08-29 ENCOUNTER — TELEPHONE (OUTPATIENT)
Dept: CARDIOLOGY CLINIC | Facility: CLINIC | Age: 39
End: 2024-08-29

## 2024-08-29 VITALS
HEIGHT: 64 IN | DIASTOLIC BLOOD PRESSURE: 74 MMHG | BODY MASS INDEX: 33.36 KG/M2 | OXYGEN SATURATION: 98 % | HEART RATE: 87 BPM | TEMPERATURE: 96.6 F | SYSTOLIC BLOOD PRESSURE: 114 MMHG | WEIGHT: 195.4 LBS

## 2024-08-29 DIAGNOSIS — I63.9 CVA (CEREBRAL VASCULAR ACCIDENT) (HCC): Primary | ICD-10-CM

## 2024-08-29 DIAGNOSIS — I10 PRIMARY HYPERTENSION: ICD-10-CM

## 2024-08-29 PROCEDURE — 99204 OFFICE O/P NEW MOD 45 MIN: CPT | Performed by: INTERNAL MEDICINE

## 2024-08-29 NOTE — PATIENT INSTRUCTIONS
Please have a 2 week cardiac monitor performed  Please call Neurology for follow up  Continue aspirin 81 mg

## 2024-08-29 NOTE — PROGRESS NOTES
[facility]    CHIEF COMPLAINT: No chief complaint on file.      HPI:  Emmett Sharif is a 38 y.o. female with a past medical history of hypertension who was referred for CVA.    Briefly, she was admitted for acute CVA on 2024.  She presented to the emergency department with a headache lasting approximately 36 hours.  She was 5 weeks gestation at the time and reported vaginal bleeding and occasional clotting.  MRI brain showed acute left MCA stroke.  Transthoracic echocardiogram showed normal biventricular size and systolic function.  Agitated saline contrast study was negative for patent foramen ovale.  She was evaluated by neurology and etiology of CVA was suspected to be associated with coagulability effects of hormonal changes around pregnancy.  Was recommended she have cardiac monitoring and thrombosis panel.  She was started on aspirin 81 mg. Unfortunately, patient had spontaneous .    Reports a history of high blood pressure diagnosed at age 23. She was amlodipine since that time up until 2-3 years ago she was switched to Labetalol when starting IVF. Hydrochlorothiazide was started after her CVA recently.    Snores but no witnessed apneas. Currently denies any lightheadedness, syncope, chest pain, palpitations, shortness of breath with exertion, orthopnea, PND, nausea, vomiting, diarrhea, dark or bright red blood in stools, lower extremity swelling, leg claudication.     Pregnancy history: This was her first natural pregnancy. Has done two frozen transfer during IVF which implanted but did not progress. No history of preeclampsia.  Social history: Never smoker. Occasional alcohol. No cocaine or amphetamines.   Family history: Mother and father without history of premature CAD or CVA. Only child.    The following portions of the patient's history were reviewed and updated as appropriate: allergies, current medications, past family history, past medical history, past social  history, past surgical history, and problem list.    SINCE LAST OV I REVIEWED WITH THE PATIENT THE INTERIM LABS, TEST RESULTS, CONSULTANT(S) NOTES AND PERFORMED AN INTERIM REVIEW OF HISTORY    Past Medical History:   Diagnosis Date    Female infertility 12/2021    Hypertension     Miscarriage 6/13/2024    Stroke (HCC) 6/13/2024       History reviewed. No pertinent surgical history.    Social History     Socioeconomic History    Marital status: /Civil Union     Spouse name: Not on file    Number of children: Not on file    Years of education: Not on file    Highest education level: Not on file   Occupational History    Not on file   Tobacco Use    Smoking status: Never    Smokeless tobacco: Never   Vaping Use    Vaping status: Never Used   Substance and Sexual Activity    Alcohol use: Not Currently     Alcohol/week: 2.0 standard drinks of alcohol     Types: 2 Glasses of wine per week    Drug use: Never    Sexual activity: Yes     Partners: Male     Birth control/protection: None   Other Topics Concern    Not on file   Social History Narrative    Not on file     Social Determinants of Health     Financial Resource Strain: Not on file   Food Insecurity: No Food Insecurity (6/14/2024)    Hunger Vital Sign     Worried About Running Out of Food in the Last Year: Never true     Ran Out of Food in the Last Year: Never true   Transportation Needs: No Transportation Needs (6/14/2024)    PRAPARE - Transportation     Lack of Transportation (Medical): No     Lack of Transportation (Non-Medical): No   Physical Activity: Not on file   Stress: Not on file   Social Connections: Not on file   Intimate Partner Violence: Not on file   Housing Stability: Low Risk  (6/14/2024)    Housing Stability Vital Sign     Unable to Pay for Housing in the Last Year: No     Number of Times Moved in the Last Year: 1     Homeless in the Last Year: No       Family History   Problem Relation Age of Onset    Hypertension Mother         Both  "Parents    Migraines Mother     Diabetes Father     Breast cancer Paternal Grandmother     Ovarian cancer Paternal Grandmother        Allergies   Allergen Reactions    Penicillins Rash and Other (See Comments)       Current Outpatient Medications   Medication Sig Dispense Refill    aspirin (ECOTRIN LOW STRENGTH) 81 mg EC tablet Take 1 tablet (81 mg total) by mouth daily 30 tablet 0    hydroCHLOROthiazide 25 mg tablet       labetalol (NORMODYNE) 200 mg tablet Take 200 mg by mouth 2 (two) times a day      amLODIPine (NORVASC) 5 mg tablet Take 1 tablet every day by oral route. (Patient not taking: Reported on 8/29/2024)      levothyroxine 75 mcg tablet Take 75 mcg by mouth daily (Patient not taking: Reported on 8/22/2024)      semaglutide, 1 mg/dose, (Ozempic, 1 MG/DOSE,) 4 mg/3 mL injection pen Inject 1 mg under the skin every 7 days (Patient not taking: Reported on 8/29/2024)       No current facility-administered medications for this visit.       /74 (BP Location: Left arm, Patient Position: Sitting, Cuff Size: Adult)   Pulse 87   Temp (!) 96.6 °F (35.9 °C) (Tympanic)   Ht 5' 4\" (1.626 m)   Wt 88.6 kg (195 lb 6.4 oz)   LMP 08/06/2024 (Exact Date)   SpO2 98%   BMI 33.54 kg/m²     Review of Systems   All other systems reviewed and are negative.      Physical Exam  Vitals reviewed.   Constitutional:       General: She is not in acute distress.     Appearance: Normal appearance. She is well-developed. She is not toxic-appearing.   HENT:      Head: Normocephalic and atraumatic.   Eyes:      General: No scleral icterus.     Conjunctiva/sclera: Conjunctivae normal.   Cardiovascular:      Rate and Rhythm: Normal rate and regular rhythm.      Heart sounds: Normal heart sounds. No murmur heard.     No gallop.   Pulmonary:      Effort: Pulmonary effort is normal. No respiratory distress.      Breath sounds: Normal breath sounds. No wheezing or rales.   Abdominal:      General: Abdomen is flat. Bowel sounds are " normal. There is no distension.      Palpations: Abdomen is soft.      Tenderness: There is no abdominal tenderness. There is no guarding.   Musculoskeletal:      Right lower leg: No edema.      Left lower leg: No edema.   Skin:     General: Skin is warm and dry.      Coloration: Skin is not jaundiced.   Neurological:      Mental Status: She is alert.   Psychiatric:         Mood and Affect: Mood normal.          Lab Results   Component Value Date    K 3.9 06/14/2024     06/14/2024    CO2 23 06/14/2024    BUN 10 06/14/2024    CREATININE 0.84 06/14/2024    CALCIUM 8.7 06/14/2024    ALT 23 06/13/2024    AST 17 06/13/2024    INR 1.04 06/13/2024       Lab Results   Component Value Date    HDL 52 06/14/2024    LDLCALC 106 (H) 06/14/2024    TRIG 90 06/14/2024       Lab Results   Component Value Date    WBC 5.56 06/14/2024    HGB 12.4 06/14/2024    HCT 37.6 06/14/2024     06/14/2024       Lab Results   Component Value Date    EAG 97 06/13/2024    HGBA1C 5.0 06/13/2024       Cardiac studies:   TTE - 6/14/24:    Left Ventricle: Left ventricular cavity size is normal. Wall thickness is normal. The left ventricular ejection fraction is 60%. Systolic function is normal. Wall motion is normal. Diastolic function is normal.    Atrial Septum: No patent foramen ovale detected, at rest or with provocation (by cough and valsalva) using agitated saline contrast. Good image quality.    Tricuspid Valve: There is mild regurgitation.     ASSESSMENT AND PLAN:  Diagnoses and all orders for this visit:    CVA (cerebral vascular accident) (HCC):  History of hypertension which historically has been well-controlled. Hemoglobin A1c 5.0%. Lipid panel - , TGs 90, HDL 52, . She reports occasional snoring but no witnessed apneas. No heavy alcohol use.   -Urged patient to follow up with Neurology and for hypercoagulability testing as previously recommended  -Will proceed with 14 day cardiac monitor to assess for atrial  arrhythmias  -Consider ILR pending follow up with Neuro  -Continue aspirin 81 mg  -     AMB extended holter monitor; Future    Primary hypertension: Blood pressure is well-controlled and at goal. Low sodium diet and moderate intensity physical activity along with weight loss recommended.    Joy Wagner MD

## 2024-10-01 ENCOUNTER — TELEPHONE (OUTPATIENT)
Age: 39
End: 2024-10-01

## 2024-10-01 NOTE — TELEPHONE ENCOUNTER
Patient called in to Schedule a HFU appt. Told her we will call her back once we clarify the instructions for scheduling.   Hospital Encounter on 6/13/24 6/13/2024 - 6/14/2024 (30 hours)  HFU / Count includes the [facility]    Ritesh Castañeda  Last attending  Treatment team /Acute ischemic left middle cerebral artery (MCA) stroke (HCC)    DC- Home  6/14/24    Emmett Sharif will need follow up in in 6 weeks with neurovascular attending. She will not require outpatient neurological testing.    Not clear instructions for scheduling.     Can you please advise if the patient will need a 30 min (short) or (Long)60 min HFU appt for F/U?     [phone] (Home Phone)

## 2024-10-04 NOTE — TELEPHONE ENCOUNTER
I called the patient to Schedule her HFU appt.       HFU / [facility]     Ritesh Castañeda  Last attending  Treatment team /Acute ischemic left middle cerebral artery (MCA) stroke (HCC)     DC- Home  6/14/24     Emmett Sharif will need follow up in in 6 weeks with neurovascular attending. She will not require outpatient neurological testing.    Patient is now scheduled with Dr. Giles _[facility] office 1/30/24 at 8am. Added to the wait list.     Can you please assist with sooner appt for the patient?     Phone # [phone]

## 2024-12-16 ENCOUNTER — TELEPHONE (OUTPATIENT)
Dept: NEUROLOGY | Facility: CLINIC | Age: 39
End: 2024-12-16

## 2024-12-16 NOTE — TELEPHONE ENCOUNTER
12/16/24 called to r/s appt on 1/30/25 due to provider not present in the office, offered sooner opening on 12/23/24 @ 11:00 pls let me know if she accepts will need management to override, thanks

## 2025-01-07 ENCOUNTER — TELEMEDICINE (OUTPATIENT)
Dept: PERINATAL CARE | Facility: CLINIC | Age: 40
End: 2025-01-07
Payer: COMMERCIAL

## 2025-01-07 DIAGNOSIS — I63.9 CVA (CEREBRAL VASCULAR ACCIDENT) (HCC): ICD-10-CM

## 2025-01-07 DIAGNOSIS — I63.512 ACUTE ISCHEMIC LEFT MIDDLE CEREBRAL ARTERY (MCA) STROKE (HCC): Primary | ICD-10-CM

## 2025-01-07 DIAGNOSIS — Z31.69 ENCOUNTER FOR PRECONCEPTION CONSULTATION: ICD-10-CM

## 2025-01-07 DIAGNOSIS — I10 PRIMARY HYPERTENSION: ICD-10-CM

## 2025-01-07 PROBLEM — O20.0 THREATENED MISCARRIAGE IN EARLY PREGNANCY: Status: RESOLVED | Noted: 2024-06-13 | Resolved: 2025-01-07

## 2025-01-07 PROCEDURE — 99244 OFF/OP CNSLTJ NEW/EST MOD 40: CPT | Performed by: OBSTETRICS & GYNECOLOGY

## 2025-01-07 NOTE — ASSESSMENT & PLAN NOTE
May have contributed to MCA stroke.  No changes at this time to HCTZ/labetalol regimen.  For aspirin.

## 2025-01-07 NOTE — ASSESSMENT & PLAN NOTE
"Has done 2 cycles IVF previously; low sperm count.  Decided to hold off on future IVF attempts.  This was prior to her stroke.  No further IVF attempts planned.  Advised folate supplementation/prenatal vitamin to prevent NTD; taking a prenatal vitamin.  BMI counseling: advise healthiest possible weight by healthiest means possible, avoiding crash/unsustainable diets.  Nutrition: advise healthy sources of protein, calcium and iron; advise minimizing added sugar in diet (<25g/day).  Does not eat fast food, cooks a lot, loves fried chicken (\"love language\"), drinks water and \"UV\" packets.  Loves honey in her 40 ounce [place] - advised consideration of using packets.    Exercise: advise 150 minutes/week regular aerobic exercise.  Walks dogs 2-3 times per day x 7 days (15-20 minutes per day total, 2-3 times daily.  Congratulated on this and encouraged she continue.  Discussed routine schedule of establishing early OB care, nuchal translucency ultrasound with aneuploidy screening if desired, detailed anatomic survey, and any additional followup.  Reviewed aneuploidy risks for current age, and when risks cross 1% threshold which occurs around age 40.  Reviewed that aneuploidy risks increase with age and that age is a spectrum, rather than a strict cutoff, though AMA classically described as age 35.  Medication review: no teratogens.  Aspirin prophylaxis: indicated by cHTN and other risk factors.  Will call her back when results come back in their entirety.  "

## 2025-01-07 NOTE — ASSESSMENT & PLAN NOTE
Reviewed cardiology note.    Has not yet had thrombosis testing.    May require anticoagulation (LMWH) so advised thrombosis testing and ordered.

## 2025-01-07 NOTE — PROGRESS NOTES
PRECONCEPTION CONSULTATION: MATERNAL-FETAL MEDICINE     Virtual Regular Visit    Verification of patient location: Emmett Sharif is located in the following state in which I hold an active license [place].    The patient was identified by name and date of birth.   She was informed that this is a telemedicine visit and that the visit is being conducted through the Epic Embedded platform. She agrees to proceed.    My office door was closed.   No one else was in the room.    She acknowledged consent and understanding of privacy and security of the platform, agrees to participate, and understands they can discontinue the visit at any time.    Patient is aware this is a billable service.     Assessment and Plan: Ms. Sharif is a 39 y.o. year-old  here for preconception counseling.  By issue:    Problem List Items Addressed This Visit          Cardiovascular and Mediastinum    Acute ischemic left middle cerebral artery (MCA) stroke (HCC) - Primary    Reviewed cardiology note.    Has not yet had thrombosis testing.    May require anticoagulation (LMWH) so advised thrombosis testing and ordered.         Relevant Orders    Protein S activity    Protein C activity    Antithrombin III Activity    Factor V Leiden Mutation    Prothrombin Gene Mutation    Beta-2 glycoprotein antibodies    Lupus anticoagulant    Cardiolipin antibody    Hypertension    May have contributed to MCA stroke.  No changes at this time to HCTZ/labetalol regimen.  For aspirin.            Other    Encounter for preconception consultation    Has done 2 cycles IVF previously; low sperm count.  Decided to hold off on future IVF attempts.  This was prior to her stroke.  No further IVF attempts planned.  Advised folate supplementation/prenatal vitamin to prevent NTD; taking a prenatal vitamin.  BMI counseling: advise healthiest possible weight by healthiest means possible, avoiding crash/unsustainable diets.  Nutrition: advise healthy sources of protein,  "calcium and iron; advise minimizing added sugar in diet (<25g/day).  Does not eat fast food, cooks a lot, loves fried chicken (\"love language\"), drinks water and \"UV\" packets.  Loves honey in her 40 ounce [place] - advised consideration of using packets.    Exercise: advise 150 minutes/week regular aerobic exercise.  Walks dogs 2-3 times per day x 7 days (15-20 minutes per day total, 2-3 times daily.  Congratulated on this and encouraged she continue.  Discussed routine schedule of establishing early OB care, nuchal translucency ultrasound with aneuploidy screening if desired, detailed anatomic survey, and any additional followup.  Reviewed aneuploidy risks for current age, and when risks cross 1% threshold which occurs around age 40.  Reviewed that aneuploidy risks increase with age and that age is a spectrum, rather than a strict cutoff, though AMA classically described as age 35.  Medication review: no teratogens.  Aspirin prophylaxis: indicated by cHTN and other risk factors.  Will call her back when results come back in their entirety.         Relevant Orders    Protein S activity    Protein C activity    Antithrombin III Activity    Factor V Leiden Mutation    Prothrombin Gene Mutation    Beta-2 glycoprotein antibodies    Lupus anticoagulant    Cardiolipin antibody     Other Visit Diagnoses         CVA (cerebral vascular accident) (HCC)        Relevant Orders    Protein S activity    Protein C activity    Antithrombin III Activity    Factor V Leiden Mutation    Prothrombin Gene Mutation    Beta-2 glycoprotein antibodies    Lupus anticoagulant    Cardiolipin antibody              Referring physician:   Ama Pinzon [facility]  [address] [place] [address]    Reason for consultation: Discuss a potential future pregnancy.    Dear Ama,    Thank you for referring patient Emmett Sharif for preconception Maternal-Fetal Medicine consultation.  Her history is as follows:    Past Medical History: "   Diagnosis Date    Female infertility 2021    Hypertension     Miscarriage 2024    Stroke (HCC) 2024       No past surgical history on file.    OB History    Para Term  AB Living   2 0 0 0 2 0   SAB IAB Ectopic Multiple Live Births   2 0 0 0 0      # Outcome Date GA Lbr Ike/2nd Weight Sex Type Anes PTL Lv   2 SAB 2024           1 SAB 2024             Social History     Tobacco Use    Smoking status: Never    Smokeless tobacco: Never   Vaping Use    Vaping status: Never Used   Substance Use Topics    Alcohol use: Not Currently     Alcohol/week: 2.0 standard drinks of alcohol     Types: 2 Glasses of wine per week    Drug use: Never       Family History   Problem Relation Age of Onset    Hypertension Mother         Both Parents    Migraines Mother     Diabetes Father     Breast cancer Paternal Grandmother     Ovarian cancer Paternal Grandmother      Current Outpatient Medications:     [id number] (PRENA1 PO), Take 1 tablet by mouth daily, Disp: , Rfl:     aspirin (ECOTRIN LOW STRENGTH) 81 mg EC tablet, Take 1 tablet (81 mg total) by mouth daily, Disp: 30 tablet, Rfl: 0    hydroCHLOROthiazide 25 mg tablet, , Disp: , Rfl:     labetalol (NORMODYNE) 200 mg tablet, Take 200 mg by mouth 2 (two) times a day, Disp: , Rfl:     Allergies   Allergen Reactions    Penicillins Rash and Other (See Comments)     Physical Exam  Constitutional:       General: She is not in acute distress.     Appearance: Normal appearance. She is not ill-appearing or toxic-appearing.   HENT:      Head: Normocephalic and atraumatic.      Nose: No congestion.   Eyes:      Extraocular Movements: Extraocular movements intact.   Pulmonary:      Effort: Pulmonary effort is normal. No respiratory distress.   Musculoskeletal:      Cervical back: Normal range of motion. No rigidity.   Skin:     Coloration: Skin is not jaundiced.   Neurological:      General: No focal deficit present.      Mental Status: She is alert  and oriented to person, place, and time.      Coordination: Coordination normal.   Psychiatric:         Mood and Affect: Mood normal.         Behavior: Behavior normal.         Thought Content: Thought content normal.         Judgment: Judgment normal.         -------------------------    The time spent on this new patient on the encounter date included previsit service time of  7 minutes reviewing records and precharting, face-to-face (via virtual platform) service time of  36 minutes counseling regarding results and coordinating care, and post-service time of  5 minutes charting, totalling 48 minutes.    At the conclusion of today's encounter, all questions were answered to her satisfaction.  Thank you very much for this kind referral and please do not hesitate to contact me with any further questions or concerns.    Sincerely,    Zora Ferreira MD  Attending Physician, Maternal-Fetal Medicine  [facility]

## 2025-01-07 NOTE — LETTER
2025     Ama Pinzon PA-C  [address] [facility] [address]    Patient: Emmett Sharif   YOB: 1985   Date of Visit: 2025       Dear Ama,    Thank you for referring Emmett Sharif to me for evaluation. Below are my notes for this consultation.    If you have questions, please do not hesitate to call me. I look forward to following your patient along with you.         Sincerely,        Zora Ferreira MD        CC: No Recipients    Zora Ferreira MD  2025 10:10 AM  Sign when Signing Visit  PRECONCEPTION CONSULTATION: MATERNAL-FETAL MEDICINE     Virtual Regular Visit    Verification of patient location: Emmett Sharif is located in the [facility] in which I hold an active license [place].    The patient was identified by name and date of birth.   She was informed that this is a telemedicine visit and that the visit is being conducted through the Epic Embedded platform. She agrees to proceed.    My office door was closed.   No one else was in the room.    She acknowledged consent and understanding of privacy and security of the platform, agrees to participate, and understands they can discontinue the visit at any time.    Patient is aware this is a billable service.     Assessment and Plan: Ms. Sharif is a 39 y.o. year-old  here for preconception counseling.  By issue:    Problem List Items Addressed This Visit          Cardiovascular and Mediastinum    Acute ischemic left middle cerebral artery (MCA) stroke (HCC) - Primary    Reviewed cardiology note.    Has not yet had thrombosis testing.    May require anticoagulation (LMWH) so advised thrombosis testing and ordered.         Relevant Orders    Protein S activity    Protein C activity    Antithrombin III Activity    Factor V Leiden Mutation    Prothrombin Gene Mutation    Beta-2 glycoprotein antibodies    Lupus anticoagulant    Cardiolipin antibody    Hypertension    May have contributed to MCA  "stroke.  No changes at this time to HCTZ/labetalol regimen.  For aspirin.            Other    Encounter for preconception consultation    Has done 2 cycles IVF previously; low sperm count.  Decided to hold off on future IVF attempts.  This was prior to her stroke.  No further IVF attempts planned.  Advised folate supplementation/prenatal vitamin to prevent NTD; taking a prenatal vitamin.  BMI counseling: advise healthiest possible weight by healthiest means possible, avoiding crash/unsustainable diets.  Nutrition: advise healthy sources of protein, calcium and iron; advise minimizing added sugar in diet (<25g/day).  Does not eat fast food, cooks a lot, loves fried chicken (\"love language\"), drinks water and \"UV\" packets.  Loves honey in her 40 ounce [place] - advised consideration of using packets.    Exercise: advise 150 minutes/week regular aerobic exercise.  Walks dogs 2-3 times per day x 7 days (15-20 minutes per day total, 2-3 times daily.  Congratulated on this and encouraged she continue.  Discussed routine schedule of establishing early OB care, nuchal translucency ultrasound with aneuploidy screening if desired, detailed anatomic survey, and any additional followup.  Reviewed aneuploidy risks for current age, and when risks cross 1% threshold which occurs around age 40.  Reviewed that aneuploidy risks increase with age and that age is a spectrum, rather than a strict cutoff, though AMA classically described as age 35.  Medication review: no teratogens.  Aspirin prophylaxis: indicated by cHTN and other risk factors.  Will call her back when results come back in their entirety.         Relevant Orders    Protein S activity    Protein C activity    Antithrombin III Activity    Factor V Leiden Mutation    Prothrombin Gene Mutation    Beta-2 glycoprotein antibodies    Lupus anticoagulant    Cardiolipin antibody     Other Visit Diagnoses         CVA (cerebral vascular accident) (HCC)        Relevant Orders    " Protein S activity    Protein C activity    Antithrombin III Activity    Factor V Leiden Mutation    Prothrombin Gene Mutation    Beta-2 glycoprotein antibodies    Lupus anticoagulant    Cardiolipin antibody              Referring physician:   Ama Pinzon Pa-c  [address] [place] [address]    Reason for consultation: Discuss a potential future pregnancy.    Dear Ama,    Thank you for referring patient Emmett Sharif for preconception Maternal-Fetal Medicine consultation.  Her history is as follows:    Past Medical History:   Diagnosis Date   • Female infertility 2021   • Hypertension    • Miscarriage 2024   • Stroke (HCC) 2024       No past surgical history on file.    OB History    Para Term  AB Living   2 0 0 0 2 0   SAB IAB Ectopic Multiple Live Births   2 0 0 0 0      # Outcome Date GA Lbr Ike/2nd Weight Sex Type Anes PTL Lv   2 SAB 2024           1 SAB 2024             Social History     Tobacco Use   • Smoking status: Never   • Smokeless tobacco: Never   Vaping Use   • Vaping status: Never Used   Substance Use Topics   • Alcohol use: Not Currently     Alcohol/week: 2.0 standard drinks of alcohol     Types: 2 Glasses of wine per week   • Drug use: Never       Family History   Problem Relation Age of Onset   • Hypertension Mother         Both Parents   • Migraines Mother    • Diabetes Father    • Breast cancer Paternal Grandmother    • Ovarian cancer Paternal Grandmother      Current Outpatient Medications:   •  [id number] (PRENA1 PO), Take 1 tablet by mouth daily, Disp: , Rfl:   •  aspirin (ECOTRIN LOW STRENGTH) 81 mg EC tablet, Take 1 tablet (81 mg total) by mouth daily, Disp: 30 tablet, Rfl: 0  •  hydroCHLOROthiazide 25 mg tablet, , Disp: , Rfl:   •  labetalol (NORMODYNE) 200 mg tablet, Take 200 mg by mouth 2 (two) times a day, Disp: , Rfl:     Allergies   Allergen Reactions   • Penicillins Rash and Other (See Comments)     Physical  Exam  Constitutional:       General: She is not in acute distress.     Appearance: Normal appearance. She is not ill-appearing or toxic-appearing.   HENT:      Head: Normocephalic and atraumatic.      Nose: No congestion.   Eyes:      Extraocular Movements: Extraocular movements intact.   Pulmonary:      Effort: Pulmonary effort is normal. No respiratory distress.   Musculoskeletal:      Cervical back: Normal range of motion. No rigidity.   Skin:     Coloration: Skin is not jaundiced.   Neurological:      General: No focal deficit present.      Mental Status: She is alert and oriented to person, place, and time.      Coordination: Coordination normal.   Psychiatric:         Mood and Affect: Mood normal.         Behavior: Behavior normal.         Thought Content: Thought content normal.         Judgment: Judgment normal.         -------------------------    The time spent on this new patient on the encounter date included previsit service time of  7 minutes reviewing records and precharting, face-to-face (via virtual platform) service time of  36 minutes counseling regarding results and coordinating care, and post-service time of  5 minutes charting, totalling 48 minutes.    At the conclusion of today's encounter, all questions were answered to her satisfaction.  Thank you very much for this kind referral and please do not hesitate to contact me with any further questions or concerns.    Sincerely,    Zora Ferreira MD  Attending Physician, Maternal-Fetal Medicine  [facility]

## 2025-01-16 ENCOUNTER — TELEPHONE (OUTPATIENT)
Dept: NEUROLOGY | Facility: CLINIC | Age: 40
End: 2025-01-16

## 2025-01-16 NOTE — TELEPHONE ENCOUNTER
Called to r/s appt on 1/30/25 due to provider not present in the office, offered pt sooner opening on 1/22/25 pls let me know if pt agrees as I may need management to override, thank you!

## 2025-01-23 ENCOUNTER — TELEPHONE (OUTPATIENT)
Dept: NEUROLOGY | Facility: CLINIC | Age: 40
End: 2025-01-23

## 2025-01-23 NOTE — TELEPHONE ENCOUNTER
Called pt to r/s HFU appt due to provider not present in the office, offered appt on 3/13/25 pt accepted is now scheduled, thank you!

## 2025-02-04 ENCOUNTER — APPOINTMENT (OUTPATIENT)
Dept: LAB | Facility: CLINIC | Age: 40
End: 2025-02-04

## 2025-02-04 DIAGNOSIS — I63.512 ACUTE ISCHEMIC LEFT MIDDLE CEREBRAL ARTERY (MCA) STROKE (HCC): ICD-10-CM

## 2025-02-04 DIAGNOSIS — Z31.69 ENCOUNTER FOR PRECONCEPTION CONSULTATION: ICD-10-CM

## 2025-02-04 DIAGNOSIS — I63.9 CVA (CEREBRAL VASCULAR ACCIDENT) (HCC): ICD-10-CM

## 2025-02-04 PROCEDURE — 36415 COLL VENOUS BLD VENIPUNCTURE: CPT

## 2025-02-10 LAB
F2 GENE MUT ANL BLD/T: NORMAL
F5 GENE MUT ANL BLD/T: NORMAL
Lab: NORMAL
Lab: NORMAL

## 2025-02-11 ENCOUNTER — RESULTS FOLLOW-UP (OUTPATIENT)
Facility: HOSPITAL | Age: 40
End: 2025-02-11

## 2025-04-07 ENCOUNTER — TELEPHONE (OUTPATIENT)
Dept: OBGYN CLINIC | Facility: CLINIC | Age: 40
End: 2025-04-07

## 2025-04-07 NOTE — TELEPHONE ENCOUNTER
Patient called to set up dating and viability - LMP 3/12/25 - was seeing mfm for preconception counseling - asking if we would want to get her in sooner or check HCG levels since her last 2 pregnancies resulted in miscarriages... Please advise.

## 2025-04-28 ENCOUNTER — NURSE TRIAGE (OUTPATIENT)
Age: 40
End: 2025-04-28

## 2025-04-28 NOTE — TELEPHONE ENCOUNTER
"FOLLOW UP: n/a    REASON FOR CONVERSATION: Pregnancy Problem    SYMPTOMS: LMP 3/12--some light pink spotting when wiping this morning. Denies any pelvic pain/cramping, denies intercourse in the last 48 hours.     OTHER: reassurance given. Advised to monitor and call back if develops period like bleeding, pain, or any other concerns. Patient verbalizes understanding.     DISPOSITION: Home Care  Reason for Disposition   SPOTTING (single or brief episode)    Answer Assessment - Initial Assessment Questions  1. ONSET: \"When did this bleeding start?\"        This morning  2. BLEEDING SEVERITY: \"Describe the bleeding that you are having.\" \"How much bleeding is there?\"       When wiping  3. ABDOMEN PAIN: \"Do you have any pain?\" \"How bad is the pain?\"  (e.g., Scale 0-10; none, mild, moderate, or severe)      Denies pain  4. PREGNANCY: \"Do you know how many weeks or months pregnant you are?\" \"When was the first day of your last normal menstrual period?\"      About 7 wks  5. ULTRASOUND: \"Have you had an ultrasound during this pregnancy?\"  Note: To confirm intrauterine pregnancy, placenta location.      no  6. HEMODYNAMIC STATUS: \"Are you weak or feeling lightheaded?\" If Yes, ask: \"Can you stand and walk normally?\"       denies  7. OTHER SYMPTOMS: \"What other symptoms are you having with the bleeding?\" (e.g., passed tissue, vaginal discharge, fever, menstrual-type cramps)      denies    Protocols used: Pregnancy - Vaginal Bleeding Less Than 20 Weeks EGA-Adult-OH    "

## 2025-05-09 ENCOUNTER — NURSE TRIAGE (OUTPATIENT)
Age: 40
End: 2025-05-09

## 2025-05-09 NOTE — TELEPHONE ENCOUNTER
"   FOLLOW UP: advised to hydrate, pelvic rest, not to strain for stool, no heavy lifting or exercise, no intercourse until D&V appointment. place panty liner and monitor. Bleeding precautions and s/sx to call back.  Inbasket sent to provider seeing patient 5/15     REASON FOR CONVERSATION: Pregnancy Problem    SYMPTOMS: LMP 3/12/25, 8 weeks 2 days EDC 12/17/25  Started brown spotting about 5 PM last night, nothing on pad, only on toilet tissue .Notices when she wipes. Slight pelvic cramps and feels cramps in her stomach area. Pain comes and goes, mild. Not soaking pads, not passing clots. Feels a little constipated sometimes, reviewed increased fluids and fiber.     OTHER: D&V 5/15. Patient verbalzied understanding of all instructions and knows when to call back.   Had sneak peak US, saw baby and heartbeat.      DISPOSITION: Home Care  Reason for Disposition   SPOTTING (single or brief episode)    Answer Assessment - Initial Assessment Questions  1. ONSET: \"When did this bleeding start?\"        5 PM last night   2. BLEEDING SEVERITY: \"Describe the bleeding that you are having.\" \"How much bleeding is there?\"       Spotting, brown when she wipes   3. ABDOMEN PAIN: \"Do you have any pain?\" \"How bad is the pain?\"  (e.g., Scale 0-10; none, mild, moderate, or severe)      3/10 pelvic cramping   4. PREGNANCY: \"Do you know how many weeks or months pregnant you are?\" \"When was the first day of your last normal menstrual period?\"      LMP 3/12/25   5. ULTRASOUND: \"Have you had an ultrasound during this pregnancy?\"  Note: To confirm intrauterine pregnancy, placenta location.      Had a sneak peak US last week and saw baby's hear beat   6. HEMODYNAMIC STATUS: \"Are you weak or feeling lightheaded?\" If Yes, ask: \"Can you stand and walk normally?\"       Denies   7. OTHER SYMPTOMS: \"What other symptoms are you having with the bleeding?\" (e.g., passed tissue, vaginal discharge, fever, menstrual-type cramps)      Denies    Protocols " used: Pregnancy - Vaginal Bleeding Less Than 20 Weeks EGA-Adult-OH

## 2025-05-12 ENCOUNTER — NURSE TRIAGE (OUTPATIENT)
Age: 40
End: 2025-05-12

## 2025-05-12 ENCOUNTER — ULTRASOUND (OUTPATIENT)
Dept: OBGYN CLINIC | Facility: CLINIC | Age: 40
End: 2025-05-12
Payer: COMMERCIAL

## 2025-05-12 VITALS
BODY MASS INDEX: 32.27 KG/M2 | HEIGHT: 64 IN | WEIGHT: 189 LBS | SYSTOLIC BLOOD PRESSURE: 136 MMHG | DIASTOLIC BLOOD PRESSURE: 88 MMHG

## 2025-05-12 DIAGNOSIS — O20.9 BLEEDING IN EARLY PREGNANCY: Primary | ICD-10-CM

## 2025-05-12 DIAGNOSIS — Z32.01 PREGNANCY CONFIRMED BY POSITIVE URINE TEST: ICD-10-CM

## 2025-05-12 PROCEDURE — 76817 TRANSVAGINAL US OBSTETRIC: CPT | Performed by: OBSTETRICS & GYNECOLOGY

## 2025-05-12 PROCEDURE — 99215 OFFICE O/P EST HI 40 MIN: CPT | Performed by: OBSTETRICS & GYNECOLOGY

## 2025-05-12 NOTE — PROGRESS NOTES
Assessment/Plan:  Diagnoses and all orders for this visit:    Bleeding in early pregnancy  -     AMB US OB < 14 weeks single or first gestation level 1    Pregnancy confirmed by positive urine test  -     AMB US OB < 14 weeks single or first gestation level 1    Other orders  -     Omega-3 Fatty Acids (OMEGA 3 500 PO); Take by mouth  -     VITAMIN D, ERGOCALCIFEROL, PO; Take by mouth        - Viable IUP @ 8w 5d EGA  - VIVIAN 2025 by LMP  - Continue PNV  - Patient to call for concerns  - RTO ~ 10-12 weeks for OB intake  - call MFM for 13 week NT scan/genetic testing.     Discussed vaginal bleeding. Recommended pelvic rest and precautions for increased bleeding provided. Advised will likely continue to see bleeding today and she may pass a few clots as well. Discussed if saturating 2 pads in an hour to call for further evaluation which is likely to include serial HCG quants. Repeat US on Thursday, 5/15/25 for viability check.     I have spent a total time of 45 minutes in caring for this patient on the day of the visit/encounter including Diagnostic results, Prognosis, Risks and benefits of tx options, Instructions for management, Patient and family education, Importance of tx compliance, Risk factor reductions, Impressions, Counseling / Coordination of care, Documenting in the medical record, Reviewing/placing orders in the medical record (including tests, medications, and/or procedures), Obtaining or reviewing history  , and Communicating with other healthcare professionals .        Subjective:       Patient ID: Emmett Sharif 1985        Emmett Sharif is a 39 y.o.  presenting to the office for pregnancy confirmation. Patient's last menstrual period was 2025 (exact date). , placing her at 8w5d today with VIVIAN of 2025. She is feeling nervous. She called earlier today with concerns for bright red bleeding that started. She described the bleeding as runny and watery and notes that it did not  saturate a pad. She has a PMH notable for 2 prior SAB's occurring.     Regular periods:yes  Nausea:yes  Vomiting: no  Bleeding: yes  Cramping: yes  Headaches: no  Fatigue: yes  Constipation: no  Blood type, if known: A+       OB History    Para Term  AB Living   3 0 0 0 2 0   SAB IAB Ectopic Multiple Live Births   2 0 0 0 0      # Outcome Date GA Lbr Ike/2nd Weight Sex Type Anes PTL Lv   3 Current            2 SAB 2024           1 SAB 2024                 The following portions of the patient's history were reviewed and updated as appropriate: allergies, current medications, past family history, past medical history, past social history, past surgical history, and problem list.    Allergies:  Penicillins    Medications:    Current Outpatient Medications:     aspirin (ECOTRIN LOW STRENGTH) 81 mg EC tablet, Take 1 tablet (81 mg total) by mouth daily, Disp: 30 tablet, Rfl: 0    hydroCHLOROthiazide 25 mg tablet, , Disp: , Rfl:     labetalol (NORMODYNE) 200 mg tablet, Take 200 mg by mouth 2 (two) times a day, Disp: , Rfl:     Omega-3 Fatty Acids (OMEGA 3 500 PO), Take by mouth, Disp: , Rfl:     [id number]-[id number]-[id number]-L0-KF (PRENA1 PO), Take 1 tablet by mouth daily, Disp: , Rfl:     VITAMIN D, ERGOCALCIFEROL, PO, Take by mouth, Disp: , Rfl:       Review of Systems:   Review of Systems   Constitutional:  Negative for chills, diaphoresis and fever.   Respiratory:  Negative for cough and shortness of breath.    Cardiovascular:  Negative for chest pain.   Gastrointestinal:  Positive for nausea. Negative for abdominal pain, constipation, diarrhea and vomiting.   Genitourinary:  Positive for menstrual problem and vaginal bleeding. Negative for difficulty urinating, dyspareunia, flank pain, pelvic pain, vaginal discharge and vaginal pain.   Musculoskeletal:  Negative for back pain.   Neurological:  Negative for weakness and headaches.          Objective:       Visit Vitals  /88 (BP Location: Left arm, Patient  "Position: Sitting, Cuff Size: Standard)   Ht 5' 4\" (1.626 m)   Wt 85.7 kg (189 lb)   LMP 03/12/2025 (Exact Date)   BMI 32.44 kg/m²   OB Status Pregnant   Smoking Status Never   BSA 1.91 m²        GEN: The patient was alert and oriented x3, pleasant well-appearing female in no acute distress.   CV: Regular rate  PULM: nonlabored respirations  MSK: Normal gait  : normal external genitalia  SSE: Clot present in vaginal vault. No dilation of external os visualized, cervix closed. Mild bleeding from cervix on exam  Skin: warm, dry  Neuro: no focal deficits  Psych: normal affect and judgement, cooperative    Ultrasound:     Viability US     Gestational sac: present               Location: uterus  Yolk sac: present  Fetal pole: present               CRL: 2.15 cm = 8w5d  Cardiac activity: present               Rate: 178 bpm     Ovaries: normal appearing bilaterally  Cul de sac: absence of free fluid  Uterus: normal in appearance           Ultrasound Probe Disinfection    A transvaginal ultrasound was performed.   Prior to use, disinfection was performed with High Level Disinfection Process (Trophon)  Probe serial number [id number]: [id number] was used    Nury Walsh MD  05/12/25  11:30 AM                    " No

## 2025-05-12 NOTE — TELEPHONE ENCOUNTER
"FOLLOW UP: RN to reach out to provider on call for recommendations and call patient back with update. Advised patient to go to ER prior to call if noting bleeding becoming heavier (saturating a pad <1 hour), severe abdominal pain, or starting to pass large clots).     ESC to provider on call, Dr. Walsh. Per provider, will have office staff call patient to follow up.     REASON FOR CONVERSATION: Vaginal Bleeding - Pregnant    SYMPTOMS: Sudden bright red vaginal bleeding, nausea.    OTHER: Patient with LMP 3/12/25 (8w5d) calling with onset of bright red sudden gush of vaginal bleeding which is persisting. Patient describes bleeding as \"runny\", not saturating a pad, but is having a flow. Notes nausea, but denies abdominal cramping, passing clots, feeling dizzy or lightheaded. RN advised will discuss with provider on call and will call patient back with update.     DISPOSITION: Discuss with Provider and Call Back Patient      Answer Assessment - Initial Assessment Questions  1. ONSET: \"When did this bleeding start?\"        Prior to call  2. BLEEDING SEVERITY: \"Describe the bleeding that you are having.\" \"How much bleeding is there?\"       Gush of blood, bright red  3. ABDOMEN PAIN: \"Do you have any pain?\" \"How bad is the pain?\"  (e.g., Scale 0-10; none, mild, moderate, or severe)      Denies  4. PREGNANCY: \"Do you know how many weeks or months pregnant you are?\" \"When was the first day of your last normal menstrual period?\"      3/12/25  5. ULTRASOUND: \"Have you had an ultrasound during this pregnancy?\"  Note: To confirm intrauterine pregnancy, placenta location.      Scheduled for 5/15/25  6. HEMODYNAMIC STATUS: \"Are you weak or feeling lightheaded?\" If Yes, ask: \"Can you stand and walk normally?\"       Denies  7. OTHER SYMPTOMS: \"What other symptoms are you having with the bleeding?\" (e.g., passed tissue, vaginal discharge, fever, menstrual-type cramps)      Nausea    Protocols used: Pregnancy - Vaginal Bleeding Less " Than 20 Weeks EGA-Adult-[place]

## 2025-05-13 NOTE — PATIENT INSTRUCTIONS
.Congratulations!! Please review our Pregnancy Essential Guide and [facility] L&D Virtual tour from our networks website.     [facility] Pregnancy Essentials Guide  [facility] ([email or web address])     [facility] ([employer])

## 2025-05-14 ENCOUNTER — INITIAL PRENATAL (OUTPATIENT)
Dept: OBGYN CLINIC | Facility: CLINIC | Age: 40
End: 2025-05-14

## 2025-05-14 ENCOUNTER — RESULTS FOLLOW-UP (OUTPATIENT)
Dept: LABOR AND DELIVERY | Facility: HOSPITAL | Age: 40
End: 2025-05-14

## 2025-05-14 ENCOUNTER — HOSPITAL ENCOUNTER (EMERGENCY)
Facility: HOSPITAL | Age: 40
Discharge: HOME | End: 2025-05-14
Payer: COMMERCIAL

## 2025-05-14 ENCOUNTER — LAB (OUTPATIENT)
Dept: LAB | Facility: AMBULARY SURGERY CENTER | Age: 40
End: 2025-05-14
Attending: OBSTETRICS & GYNECOLOGY
Payer: COMMERCIAL

## 2025-05-14 VITALS
BODY MASS INDEX: 32.64 KG/M2 | HEIGHT: 64 IN | SYSTOLIC BLOOD PRESSURE: 122 MMHG | DIASTOLIC BLOOD PRESSURE: 84 MMHG | WEIGHT: 191.2 LBS

## 2025-05-14 VITALS
SYSTOLIC BLOOD PRESSURE: 131 MMHG | TEMPERATURE: 98.3 F | OXYGEN SATURATION: 100 % | HEART RATE: 70 BPM | DIASTOLIC BLOOD PRESSURE: 78 MMHG | RESPIRATION RATE: 20 BRPM

## 2025-05-14 DIAGNOSIS — E87.6 HYPOKALEMIA: Primary | ICD-10-CM

## 2025-05-14 DIAGNOSIS — Z34.01 ENCOUNTER FOR SUPERVISION OF NORMAL FIRST PREGNANCY IN FIRST TRIMESTER: ICD-10-CM

## 2025-05-14 DIAGNOSIS — Z31.430 ENCOUNTER OF FEMALE FOR TESTING FOR GENETIC DISEASE CARRIER STATUS FOR PROCREATIVE MANAGEMENT: ICD-10-CM

## 2025-05-14 DIAGNOSIS — Z86.79 HISTORY OF HYPERTENSION: ICD-10-CM

## 2025-05-14 DIAGNOSIS — Z86.79 HISTORY OF HYPERTENSION: Primary | ICD-10-CM

## 2025-05-14 DIAGNOSIS — Z36.9 ENCOUNTER FOR ANTENATAL SCREENING: ICD-10-CM

## 2025-05-14 DIAGNOSIS — R63.8 INCREASED BMI: ICD-10-CM

## 2025-05-14 DIAGNOSIS — Z3A.09 9 WEEKS GESTATION OF PREGNANCY: ICD-10-CM

## 2025-05-14 LAB
ABO GROUP BLD: NORMAL
ALBUMIN SERPL BCG-MCNC: 4 G/DL (ref 3.5–5)
ALP SERPL-CCNC: 48 U/L (ref 34–104)
ALT SERPL W P-5'-P-CCNC: 25 U/L (ref 7–52)
AMORPH URATE CRY URNS QL MICRO: ABNORMAL
ANION GAP SERPL CALCULATED.3IONS-SCNC: 11 MMOL/L (ref 4–13)
ANION GAP SERPL CALCULATED.3IONS-SCNC: 7 MMOL/L (ref 4–13)
AST SERPL W P-5'-P-CCNC: 18 U/L (ref 13–39)
BACTERIA UR QL AUTO: ABNORMAL /HPF
BASOPHILS # BLD AUTO: 0.03 THOUSANDS/ÂΜL (ref 0–0.1)
BASOPHILS # BLD AUTO: 0.03 THOUSANDS/ÂΜL (ref 0–0.1)
BASOPHILS NFR BLD AUTO: 0 % (ref 0–1)
BASOPHILS NFR BLD AUTO: 0 % (ref 0–1)
BILIRUB SERPL-MCNC: 0.63 MG/DL (ref 0.2–1)
BILIRUB UR QL STRIP: NEGATIVE
BLD GP AB SCN SERPL QL: NEGATIVE
BUN SERPL-MCNC: 12 MG/DL (ref 5–25)
BUN SERPL-MCNC: 12 MG/DL (ref 5–25)
CALCIUM SERPL-MCNC: 9.4 MG/DL (ref 8.4–10.2)
CALCIUM SERPL-MCNC: 9.6 MG/DL (ref 8.4–10.2)
CHLORIDE SERPL-SCNC: 100 MMOL/L (ref 96–108)
CHLORIDE SERPL-SCNC: 98 MMOL/L (ref 96–108)
CLARITY UR: CLEAR
CO2 SERPL-SCNC: 25 MMOL/L (ref 21–32)
CO2 SERPL-SCNC: 28 MMOL/L (ref 21–32)
COLOR UR: YELLOW
CREAT SERPL-MCNC: 0.74 MG/DL (ref 0.6–1.3)
CREAT SERPL-MCNC: 0.81 MG/DL (ref 0.6–1.3)
CREAT UR-MCNC: 197.3 MG/DL
EOSINOPHIL # BLD AUTO: 0.37 THOUSAND/ÂΜL (ref 0–0.61)
EOSINOPHIL # BLD AUTO: 0.51 THOUSAND/ÂΜL (ref 0–0.61)
EOSINOPHIL NFR BLD AUTO: 5 % (ref 0–6)
EOSINOPHIL NFR BLD AUTO: 6 % (ref 0–6)
ERYTHROCYTE [DISTWIDTH] IN BLOOD BY AUTOMATED COUNT: 11.9 % (ref 11.6–15.1)
ERYTHROCYTE [DISTWIDTH] IN BLOOD BY AUTOMATED COUNT: 11.9 % (ref 11.6–15.1)
GFR SERPL CREATININE-BSD FRML MDRD: 103 ML/MIN/1.73SQ M
GFR SERPL CREATININE-BSD FRML MDRD: 93 ML/MIN/1.73SQ M
GLUCOSE P FAST SERPL-MCNC: 117 MG/DL (ref 65–99)
GLUCOSE SERPL-MCNC: 93 MG/DL (ref 65–140)
GLUCOSE UR STRIP-MCNC: NEGATIVE MG/DL
HCT VFR BLD AUTO: 35.1 % (ref 34.8–46.1)
HCT VFR BLD AUTO: 37 % (ref 34.8–46.1)
HGB BLD-MCNC: 11.8 G/DL (ref 11.5–15.4)
HGB BLD-MCNC: 12.5 G/DL (ref 11.5–15.4)
HGB UR QL STRIP.AUTO: ABNORMAL
IMM GRANULOCYTES # BLD AUTO: 0.02 THOUSAND/UL (ref 0–0.2)
IMM GRANULOCYTES # BLD AUTO: 0.03 THOUSAND/UL (ref 0–0.2)
IMM GRANULOCYTES NFR BLD AUTO: 0 % (ref 0–2)
IMM GRANULOCYTES NFR BLD AUTO: 0 % (ref 0–2)
KETONES UR STRIP-MCNC: NEGATIVE MG/DL
LEUKOCYTE ESTERASE UR QL STRIP: NEGATIVE
LYMPHOCYTES # BLD AUTO: 1.51 THOUSANDS/ÂΜL (ref 0.6–4.47)
LYMPHOCYTES # BLD AUTO: 2.36 THOUSANDS/ÂΜL (ref 0.6–4.47)
LYMPHOCYTES NFR BLD AUTO: 21 % (ref 14–44)
LYMPHOCYTES NFR BLD AUTO: 28 % (ref 14–44)
MCH RBC QN AUTO: 30.5 PG (ref 26.8–34.3)
MCH RBC QN AUTO: 30.6 PG (ref 26.8–34.3)
MCHC RBC AUTO-ENTMCNC: 33.6 G/DL (ref 31.4–37.4)
MCHC RBC AUTO-ENTMCNC: 33.8 G/DL (ref 31.4–37.4)
MCV RBC AUTO: 91 FL (ref 82–98)
MCV RBC AUTO: 91 FL (ref 82–98)
MONOCYTES # BLD AUTO: 0.47 THOUSAND/ÂΜL (ref 0.17–1.22)
MONOCYTES # BLD AUTO: 1.02 THOUSAND/ÂΜL (ref 0.17–1.22)
MONOCYTES NFR BLD AUTO: 12 % (ref 4–12)
MONOCYTES NFR BLD AUTO: 6 % (ref 4–12)
MUCOUS THREADS UR QL AUTO: ABNORMAL
NEUTROPHILS # BLD AUTO: 4.59 THOUSANDS/ÂΜL (ref 1.85–7.62)
NEUTROPHILS # BLD AUTO: 4.94 THOUSANDS/ÂΜL (ref 1.85–7.62)
NEUTS SEG NFR BLD AUTO: 54 % (ref 43–75)
NEUTS SEG NFR BLD AUTO: 68 % (ref 43–75)
NITRITE UR QL STRIP: NEGATIVE
NON-SQ EPI CELLS URNS QL MICRO: ABNORMAL /HPF
NRBC BLD AUTO-RTO: 0 /100 WBCS
NRBC BLD AUTO-RTO: 0 /100 WBCS
PH UR STRIP.AUTO: 6 [PH]
PLATELET # BLD AUTO: 298 THOUSANDS/UL (ref 149–390)
PLATELET # BLD AUTO: 323 THOUSANDS/UL (ref 149–390)
PMV BLD AUTO: 10.5 FL (ref 8.9–12.7)
PMV BLD AUTO: 9.9 FL (ref 8.9–12.7)
POTASSIUM SERPL-SCNC: 2.9 MMOL/L (ref 3.5–5.3)
POTASSIUM SERPL-SCNC: 3.2 MMOL/L (ref 3.5–5.3)
PROT SERPL-MCNC: 7.5 G/DL (ref 6.4–8.4)
PROT UR STRIP-MCNC: ABNORMAL MG/DL
PROT UR-MCNC: 11.4 MG/DL
PROT/CREAT UR: 0.1 MG/G{CREAT}
RBC # BLD AUTO: 3.87 MILLION/UL (ref 3.81–5.12)
RBC # BLD AUTO: 4.08 MILLION/UL (ref 3.81–5.12)
RBC #/AREA URNS AUTO: ABNORMAL /HPF
RH BLD: POSITIVE
RUBV IGG SERPL IA-ACNC: 59.6 IU/ML
SODIUM SERPL-SCNC: 134 MMOL/L (ref 135–147)
SODIUM SERPL-SCNC: 135 MMOL/L (ref 135–147)
SP GR UR STRIP.AUTO: 1.02 (ref 1–1.03)
SPECIMEN EXPIRATION DATE: NORMAL
URATE SERPL-MCNC: 5 MG/DL (ref 2–7.5)
UROBILINOGEN UR STRIP-ACNC: <2 MG/DL
WBC # BLD AUTO: 7.34 THOUSAND/UL (ref 4.31–10.16)
WBC # BLD AUTO: 8.54 THOUSAND/UL (ref 4.31–10.16)
WBC #/AREA URNS AUTO: ABNORMAL /HPF

## 2025-05-14 PROCEDURE — 99283 EMERGENCY DEPT VISIT LOW MDM: CPT

## 2025-05-14 PROCEDURE — 85025 COMPLETE CBC W/AUTO DIFF WBC: CPT

## 2025-05-14 PROCEDURE — 86900 BLOOD TYPING SEROLOGIC ABO: CPT

## 2025-05-14 PROCEDURE — 80053 COMPREHEN METABOLIC PANEL: CPT

## 2025-05-14 PROCEDURE — 87389 HIV-1 AG W/HIV-1&-2 AB AG IA: CPT

## 2025-05-14 PROCEDURE — 86787 VARICELLA-ZOSTER ANTIBODY: CPT

## 2025-05-14 PROCEDURE — 36415 COLL VENOUS BLD VENIPUNCTURE: CPT

## 2025-05-14 PROCEDURE — 81001 URINALYSIS AUTO W/SCOPE: CPT

## 2025-05-14 PROCEDURE — 80048 BASIC METABOLIC PNL TOTAL CA: CPT

## 2025-05-14 PROCEDURE — 86901 BLOOD TYPING SEROLOGIC RH(D): CPT

## 2025-05-14 PROCEDURE — 86706 HEP B SURFACE ANTIBODY: CPT

## 2025-05-14 PROCEDURE — OBC

## 2025-05-14 PROCEDURE — 84550 ASSAY OF BLOOD/URIC ACID: CPT

## 2025-05-14 PROCEDURE — 86850 RBC ANTIBODY SCREEN: CPT

## 2025-05-14 PROCEDURE — 86803 HEPATITIS C AB TEST: CPT

## 2025-05-14 PROCEDURE — 86762 RUBELLA ANTIBODY: CPT

## 2025-05-14 PROCEDURE — 87340 HEPATITIS B SURFACE AG IA: CPT

## 2025-05-14 PROCEDURE — 87086 URINE CULTURE/COLONY COUNT: CPT

## 2025-05-14 PROCEDURE — 86780 TREPONEMA PALLIDUM: CPT

## 2025-05-14 PROCEDURE — 76815 OB US LIMITED FETUS(S): CPT

## 2025-05-14 PROCEDURE — 93005 ELECTROCARDIOGRAM TRACING: CPT

## 2025-05-14 PROCEDURE — 84156 ASSAY OF PROTEIN URINE: CPT

## 2025-05-14 PROCEDURE — 82570 ASSAY OF URINE CREATININE: CPT

## 2025-05-14 PROCEDURE — 99284 EMERGENCY DEPT VISIT MOD MDM: CPT

## 2025-05-14 RX ORDER — POTASSIUM CHLORIDE 1500 MG/1
40 TABLET, EXTENDED RELEASE ORAL ONCE
Status: COMPLETED | OUTPATIENT
Start: 2025-05-14 | End: 2025-05-14

## 2025-05-14 RX ADMIN — POTASSIUM CHLORIDE 40 MEQ: 1500 TABLET, EXTENDED RELEASE ORAL at 18:00

## 2025-05-14 NOTE — PROGRESS NOTES
.  OB INTAKE INTERVIEW  Patient is 39 y.o. who presents for OB intake at 9.0wks  She is accompanied by  herself  during this encounter  The father of her baby ( Ashok) is involved in the pregnancy and is 38 years old.    [id number]  Last Menstrual Period: 03/12/2025  Ultrasound: Measured 8 weeks 5 days on 05/12/2025  Estimated Date of Delivery: 12/17/2025  confirmed by LMP    Signs/Symptoms of Pregnancy  Current pregnancy symptoms:  breast tenderness, gas, crazy dreams, nausea   Constipation YES  Headaches YES, occasional   Cramping/spotting YES, passed clots, provider aware   PICA cravings no    Diabetes-  Body mass index is 32.82 kg/m².  If patient has 1 or more, please order early 1 hour GTT  History of GDM no  BMI >35 no  History of PCOS or current metformin use (should stop for 7 days prior to 1hr GTT unless pre-existing diabetes)  no  History of LGA/macrosomic infant (4000g/9lbs) no    If patient has 2 or more, please order early 1 hour GTT  BMI>30 YES  AMA YES  First degree relative with type 2 diabetes YES, pt dad   History of chronic HTN, hyperlipidemia, elevated A1C YES, chronic HTN   High risk race (, , ,  or ) YES AFA, [place] ,       Hypertension- if you answer yes to any of the following, please order baseline preeclampsia labs (cbc, comprehensive metabolic panel, urine protein creatinine ratio, uric acid)  History of of chronic HTN YES  History of gestational HTN no  History of preeclampsia, eclampsia, or HELLP syndrome no  History of diabetes no  History of lupus,sjogrens syndrome, kidney disease no    Thyroid- if yes order TSH with reflex T4  History of thyroid disease no    Bleeding Disorder or Hx of DVT-patient or first degree relative with history of. Order the following if not done previously.   (Factor V, antithrombin III, prothrombin gene mutation, protein C and S Ag, lupus anticoagulant, anticardiolipin, beta-2  glycoprotein)   no    OB/GYN-  History of abnormal pap smear no       Date of last pap smear unknown   History of HPV no  History of Herpes/HSV no  History of other STI (gonorrhea, chlamydia, trich) no  History of prior  no  History of prior  no  History of  delivery prior to 36 weeks 6 days no  History of Varicella or Vaccination  Had pox as a child  History of blood transfusion no  Ok for blood transfusion  yes    Substance screening-   History of tobacco use no  Currently using tobacco no  Substance Use Screen Level  N/A     MRSA Screening-   Does the pt have a hx of MRSA? no    Immunizations:  Influenza vaccine given this season  no  Discussed Tdap vaccine  yes  Discussed COVID Vaccine  yes    Genetic/[facility]-  Do you or your partner have a history of any of the following in yourselves or first degree relatives?  Cystic fibrosis no  Spinal muscular atrophy no  Hemoglobinopathy/Sickle Cell/Thalassemia no  Fragile X Intellectual Disability no        discussed Carrier Screening being completed once in a lifetime as a standard of care lab test.  Tested with [facility], in past , all results were negative     Appointment for Nuchal Translucency Ultrasound at [facility] scheduled for 6/10/2025      Interview education  [facility] Pregnancy Essentials Book reviewed, discussed and attached to their AVS  yes    Nurse/Family Partnership- patient may qualify   No  referral placed  no    Prenatal lab work scripts  yes  Extra labs ordered:   Varicella, PIH, 1 hr gtt    Aspirin/Preeclampsia Screen    Risk Level Risk Factor Recommendation   LOW Prior Uncomplicated full-term delivery no No Aspirin recommendation        MODERATE Nulliparity YES Recommend low-dose aspirin if     BMI>30 YES 2 or more moderate risk factors    Family History Preeclampsia (mother/sister) no     35yr old or greater YES     Black Race, Concern for SDOH/Low Socioeconomic YES     IVF Pregnancy  no     Personal History Risks (low birth weight, prior  adverse preg outcome, >10yr preg interval) no         HIGH History of Preeclampsia no Recommend low-dose aspirin if     Multifetal gestation no 1 or more high risk factors    Chronic HTN no     Type 1 or 2 Diabetes no     Renal Disease no     Autoimmune Disease  no      Contraindications to ASA therapy:  NSAID/ ASA allergy: no  Nasal polyps: no  Asthma with history of ASA induced bronchospasm: no  Relative contraindications:  History of GI bleed: no  Active peptic ulcer disease: no  Severe hepatic dysfunction: no    Patient should be recommended to take ASA 162mg during this pregnancy from 12-36wks to lower her risk of preeclampsia:  discussed , currently takes 81 mg daily           The patient has a history now or in prior pregnancy notable for:  2 sab's , AMA       Details that I feel the provider should be aware of: This is a planned and welcomed pregnancy for parents.Patient is 39 years old(AMA). Patient is feeling generally well. Patient is experiencing some  breast tenderness, gas, crazy dreams, nausea , constipation, occasional headaches, spotting, passed clots (provider ware ). She had 2 prior miscarriages, 06/2020 and 12/2024.(Female in fertility, saw [facility] in past) this pregnancy was conceived naturally .Patient has a history of acute ischemic left middle cerebral artery(Stroke) , anxiety, no medication, and Chronic hypertension(PIH labs were given) , she takes labetalol . Her BMI is 32.94 , A 1 hr gtt was ordered.Her mother has a history of migraine's and hypertension. Her Father has Diabetes and hypertension. FOB and his family, (no medical history noted ).   OTC medications and diet were discussed. Patient knows to call OB for symptoms and how to contact her nurse navigator. Patient was made aware to have lab orders completed before next appointment. Patient denies any questions at this point and verbalizes understanding.PN1 visit scheduled. The patient was oriented to our practice, the navigator role,  reviewed delivering physicians and [facility] for Delivery. All questions were answered.    Interviewed by: .Electronically Signed by Shilpa Carias LPN , Ob Nurse Navigator

## 2025-05-14 NOTE — TELEPHONE ENCOUNTER
Called patient regarding prenatal labs.   Potassium level noted to be at 2.9.   Has h/o chronic htn and on labetalol and HCTZ.   Also history of L. MCA stroke in 2024.   Discussed concern for hypokalemia. She is nauseous but not having any vomiting. Denies any chest pain or shortness of breath.   Advised reporting to ER to rule out any cardiac abnormalities, repeat level and receive repletion of potassium.   Patient is agreeable and will present to ER.     Sulema Turcios MD  [facility]   4:07 PM  5/14/2025

## 2025-05-14 NOTE — DISCHARGE INSTRUCTIONS
Return sooner to the Emergency Department if increased pain, swelling, numbness, weakness, fever, redness, vomiting.    Please continue to follow-up with your primary care provider for continued evaluation and monitoring of your symptoms.  Also continue following up with your OB/GYN for further evaluation and monitoring of your pregnancy.    Is important for you to discuss with previous providers given your current medication utilization of a diuretic medication that may be affecting your potassium level, is important for you to discuss with them your current utilization of this medicine and the role of potential potassium supplementation.

## 2025-05-14 NOTE — ED PROVIDER NOTES
Time reflects when diagnosis was documented in both MDM as applicable and the Disposition within this note       Time User Action Codes Description Comment    2025  6:11 PM Davion Chavez [E87.6] Hypokalemia     2025  6:11 PM Davion Chavez [Z3A.09] 9 weeks gestation of pregnancy           ED Disposition       ED Disposition   Discharge    Condition   Stable    Date/Time   Wed May 14, 2025  6:13 PM    Comment   Emmett Sharif discharge to home/self care.                   Assessment & Plan       Medical Decision Making  Patient is a pleasant -0-2-0 female currently at 9 weeks gestation who presents after outpatient labs are notable for hypokalemia down to 2.9.  Repeat laboratory study today in the emergency department of 3.2.  Patient describes no other symptomology outside of baseline nausea in the setting of recurrent pregnancy with no vaginal bleeding or discharge, no chest pain or shortness of breath and no rashes or discolorations.    DDx including but not limited to: hyperkalemia, factitious hyperkalemia, renal failure, metabolic abnormality, need for dialysis.      Based off initial presenting complaints, laboratory studies were completed as well as ECG tracing which was unremarkable for any acute arrhythmias or abnormalities.  Monitored in the emergency department with vitals within appropriate limits.  Initial triage blood pressure was noted by patient as well as by staff to be with malpositioned blood pressure cuff was promptly repeated to get a more accurate assessment of blood pressure which was within normal limits.    Point-of-care bedside ultrasonography was conducted by resident team in the emergency department with identified intrauterine pregnancy with no pelvic free fluid appreciated and fetal cardiac activity noted.    Based off of patient's current electrolyte level, oral repletion with high dosage of potassium was initiated while in the emergency department.   Feel that the most likely culprit is the patient's current medication regimen which can be associated with decreases in potassium level.  Encouraged and discussed dietary modifications as well as potential outpatient supplementation but encouraged the patient to follow-up with outpatient providers that are managing current prescription medications as well as close follow-up with OB/GYN colleagues for further interval assessment and monitoring of symptomology.    Strict return precautions that warrant continued evaluation in the emergency department were discussed at length at bedside.  Patient and spouse in understanding with this plan and return if any worsening of symptomology was noted.    Amount and/or Complexity of Data Reviewed  Labs: ordered. Decision-making details documented in ED Course.    Risk  Prescription drug management.        ED Course as of 05/14/25 2232   Wed May 14, 2025   1744 CBC and differential  Grossly unremarkable   1751 Potassium(!): 3.2   1754 Urine analysis completed today that does not meet criteria for urinary tract infection at this time with microscopy only notable for presence of mucus threads.  Will not repeat urine analysis in the emergency department as patient has not had any change in symptoms since this laboratory study was conducted.   1755 Blood Pressure: 131/78  Initial blood pressure was noted to be a bit elevated on triage but then after laying patient resting and recycling blood pressure, secondary value (131/78) was noted I feel that this is a more accurate representation of the patient's current blood pressure.  I do not feel the patient meets criteria for preeclampsia at this time.  Patient is nine weeks pregnant.       Medications   potassium chloride (Klor-Con M20) CR tablet 40 mEq (40 mEq Oral Given 5/14/25 1800)       ED Risk Strat Scores                    No data recorded        SBIRT 20yo+      Flowsheet Row Most Recent Value   Initial Alcohol Screen: US  AUDIT-C     1. How often do you have a drink containing alcohol? 0 Filed at: 2025   2. How many drinks containing alcohol do you have on a typical day you are drinking?  0 Filed at: 2025   3a. Male UNDER 65: How often do you have five or more drinks on one occasion? 0 Filed at: 2025   3b. FEMALE Any Age, or MALE 65+: How often do you have 4 or more drinks on one occassion? 0 Filed at: 2025   Audit-C Score 0 Filed at: 2025   LILY: How many times in the past year have you...    Used an illegal drug or used a prescription medication for non-medical reasons? Never Filed at: 2025                            History of Present Illness       Chief Complaint   Patient presents with    Abnormal Lab     Pt is 9 weeks pregnant. Pt states pre rufina labs show potassium of 2.9. Denies SOB/CP        Past Medical History:   Diagnosis Date    Anxiety     no medication    Female infertility 2021    2x miscarriages    Hypertension     chronic takes medication    Miscarriage 6/13/2024    X2    Stroke (HCC) 2024    Varicella     Had pox as  a child      Past Surgical History:   Procedure Laterality Date    OTHER SURGICAL HISTORY      Broken radius and broken femur right side    TONSILLECTOMY        Family History   Problem Relation Age of Onset    Hypertension Mother         Both Parents    Migraines Mother     Diabetes Father     Hypertension Father     Dementia Maternal Grandmother     Substance Abuse Maternal Grandfather         opoid addiction    Schizophrenia Maternal Grandfather     Breast cancer Paternal Grandmother 50 - 59    Ovarian cancer Paternal Grandmother 73    Febrile seizures Paternal Grandmother     No Known Problems Paternal Grandfather     Lupus Other     Mental illness Other       Social History[1]   E-Cigarette/Vaping    E-Cigarette Use Never User       E-Cigarette/Vaping Substances    Nicotine No     THC Yes past    CBD Yes past     Flavoring No     Other No     Unknown No       I have reviewed and agree with the history as documented.     Patient is a pleasant 39-year-old female.  Currently at 9 weeks gestation with normal pregnancy as far as patient is aware of up to this point.  -0-2-0.  No reported abdominal discomfort, vomiting, vaginal bleeding, discharge, changes in urination.  Patient had outpatient laboratory studies conducted and was noted to have a decreased potassium level and was recommended to come to the emergency department for further cardiac workup.  Laboratory evaluation was noted for a potassium level of 2.9.    Patient is noted to be on diuretic therapy and was counseled that this could be contributing to the patient's decrease in potassium level.    Patient denies any abdominal trauma.  Denies any chest pain, shortness of breath, or significant palpitations.  She states if she was not informed of laboratory abnormality, she would not be seeking care today in the emergency department.  She is following up dutifully at the request of her outpatient providers for further assessment of symptomology.      History provided by:  Patient   used: No        Review of Systems   Constitutional:  Negative for chills and fever.   HENT:  Negative for ear pain and sore throat.    Eyes:  Negative for pain and visual disturbance.   Respiratory:  Negative for cough and shortness of breath.    Cardiovascular:  Negative for chest pain and palpitations.   Gastrointestinal:  Negative for abdominal pain and vomiting.   Genitourinary:  Negative for dysuria and hematuria.   Musculoskeletal:  Negative for arthralgias and back pain.   Skin:  Negative for color change and rash.   Neurological:  Negative for seizures and syncope.   All other systems reviewed and are negative.          Objective       ED Triage Vitals   Temperature Pulse Blood Pressure Respirations SpO2 Patient Position - Orthostatic VS   25 1718 25  1716 05/14/25 1716 05/14/25 1716 05/14/25 1716 --   98.3 °F (36.8 °C) 70 154/82 20 100 %       Temp Source Heart Rate Source BP Location FiO2 (%) Pain Score    05/14/25 1718 05/14/25 1716 05/14/25 1716 -- --    Oral Monitor Right arm        Vitals      Date and Time Temp Pulse SpO2 Resp BP Pain Score FACES Pain Rating User   05/14/25 1718 98.3 °F (36.8 °C) -- -- -- 131/78 -- -- SM   05/14/25 1716 -- 70 100 % 20 154/82 -- -- SM            Physical Exam  Vitals and nursing note reviewed.   Constitutional:       Appearance: Normal appearance. She is well-developed. She is obese. She is not ill-appearing or diaphoretic.   HENT:      Head: Normocephalic and atraumatic.      Right Ear: External ear normal.      Left Ear: External ear normal.      Nose: Nose normal. No congestion.      Mouth/Throat:      Mouth: Mucous membranes are moist.     Eyes:      Conjunctiva/sclera: Conjunctivae normal.       Cardiovascular:      Rate and Rhythm: Normal rate and regular rhythm.      Pulses: Normal pulses.      Heart sounds: No murmur heard.  Pulmonary:      Effort: Pulmonary effort is normal. No respiratory distress.      Breath sounds: Normal breath sounds.   Abdominal:      Palpations: Abdomen is soft.      Tenderness: There is no abdominal tenderness. There is no guarding.     Musculoskeletal:         General: No swelling, deformity or signs of injury.      Cervical back: Normal range of motion and neck supple. No rigidity.     Skin:     General: Skin is warm and dry.      Capillary Refill: Capillary refill takes less than 2 seconds.     Neurological:      General: No focal deficit present.      Mental Status: She is alert and oriented to person, place, and time.      Cranial Nerves: No cranial nerve deficit.      Motor: No weakness.     Psychiatric:         Mood and Affect: Mood normal.         Results Reviewed       Procedure Component Value Units Date/Time    Basic metabolic panel [[id number]]  (Abnormal) Collected: 05/14/25  1728    Lab Status: Final result Specimen: Blood from Arm, Right Updated: 05/14/25 1751     Sodium 135 mmol/L      Potassium 3.2 mmol/L      Chloride 100 mmol/L      CO2 28 mmol/L      ANION GAP 7 mmol/L      BUN 12 mg/dL      Creatinine 0.81 mg/dL      Glucose 93 mg/dL      Calcium 9.4 mg/dL      eGFR 93 ml/min/1.73sq m     Narrative:      National Kidney Disease Foundation guidelines for Chronic Kidney Disease (CKD):     Stage 1 with normal or high GFR (GFR > 90 mL/min/1.73 square meters)    Stage 2 Mild CKD (GFR = 60-89 mL/min/1.73 square meters)    Stage 3A Moderate CKD (GFR = 45-59 mL/min/1.73 square meters)    Stage 3B Moderate CKD (GFR = 30-44 mL/min/1.73 square meters)    Stage 4 Severe CKD (GFR = 15-29 mL/min/1.73 square meters)    Stage 5 End Stage CKD (GFR <15 mL/min/1.73 square meters)  Note: GFR calculation is accurate only with a steady state creatinine    CBC and differential [[id number]] Collected: 05/14/25 1728    Lab Status: Final result Specimen: Blood from Arm, Right Updated: 05/14/25 1741     WBC 8.54 Thousand/uL      RBC 3.87 Million/uL      Hemoglobin 11.8 g/dL      Hematocrit 35.1 %      MCV 91 fL      MCH 30.5 pg      MCHC 33.6 g/dL      RDW 11.9 %      MPV 9.9 fL      Platelets 298 Thousands/uL      nRBC 0 /100 WBCs      Segmented % 54 %      Immature Grans % 0 %      Lymphocytes % 28 %      Monocytes % 12 %      Eosinophils Relative 6 %      Basophils Relative 0 %      Absolute Neutrophils 4.59 Thousands/µL      Absolute Immature Grans 0.03 Thousand/uL      Absolute Lymphocytes 2.36 Thousands/µL      Absolute Monocytes 1.02 Thousand/µL      Eosinophils Absolute 0.51 Thousand/µL      Basophils Absolute 0.03 Thousands/µL             No orders to display       ECG 12 Lead Documentation Only    Date/Time: 5/14/2025 6:10 PM    Performed by: Davion Chavez MD  Authorized by: Davion Chavez MD    Indications / Diagnosis:  Metabolic abnormality, hypokalemia  ECG reviewed  by me, the ED Provider: yes    Patient location:  ED  Previous ECG:     Previous ECG:  Compared to current    Similarity:  No change    Comparison to cardiac monitor: Yes    Interpretation:     Interpretation: normal    Quality:     Tracing quality:  Limited by artifact  Rate:     ECG rate:  75    ECG rate assessment: normal    Rhythm:     Rhythm: sinus rhythm    Ectopy:     Ectopy: none    QRS:     QRS axis:  Normal    QRS intervals:  Normal  Conduction:     Conduction: normal    ST segments:     ST segments:  Normal  T waves:     T waves: normal    Comments:      QTc 439      ED Medication and Procedure Management   Prior to Admission Medications   Prescriptions Last Dose Informant Patient Reported? Taking?   Omega-3 Fatty Acids (OMEGA 3 500 PO)   Yes No   Sig: Take by mouth   [id number] (PRENA1 PO)   Yes No   Sig: Take 1 tablet by mouth in the morning.   VITAMIN D, ERGOCALCIFEROL, PO   Yes No   Sig: Take by mouth   aspirin (ECOTRIN LOW STRENGTH) 81 mg EC tablet   No No   Sig: Take 1 tablet (81 mg total) by mouth daily   hydroCHLOROthiazide 25 mg tablet   Yes No   labetalol (NORMODYNE) 200 mg tablet   Yes No   Sig: Take 200 mg by mouth in the morning and 200 mg in the evening.      Facility-Administered Medications: None     Discharge Medication List as of 5/14/2025  6:15 PM        CONTINUE these medications which have NOT CHANGED    Details   aspirin (ECOTRIN LOW STRENGTH) 81 mg EC tablet Take 1 tablet (81 mg total) by mouth daily, Starting Sat 6/15/2024, Normal      hydroCHLOROthiazide 25 mg tablet Historical Med      labetalol (NORMODYNE) 200 mg tablet Take 200 mg by mouth in the morning and 200 mg in the evening., Historical Med      Omega-3 Fatty Acids (OMEGA 3 500 PO) Take by mouth, Historical Med      Ezkric-[id number] (PRENA1 PO) Take 1 tablet by mouth in the morning., Historical Med      VITAMIN D, ERGOCALCIFEROL, PO Take by mouth, Historical Med           No discharge procedures on  file.  ED SEPSIS DOCUMENTATION   Time reflects when diagnosis was documented in both MDM as applicable and the Disposition within this note       Time User Action Codes Description Comment    5/14/2025  6:11 PM Davion Chavez [E87.6] Hypokalemia     5/14/2025  6:11 PM Davion Chavez [Z3A.09] 9 weeks gestation of pregnancy                      [1]   Social History  Tobacco Use    Smoking status: Never    Smokeless tobacco: Never   Vaping Use    Vaping status: Never Used   Substance Use Topics    Alcohol use: Not Currently     Alcohol/week: 2.0 standard drinks of alcohol     Types: 2 Glasses of wine per week    Drug use: Yes     Types: Marijuana     Comment: In past CBD        Davion Chavez MD  05/14/25 2228

## 2025-05-15 ENCOUNTER — ULTRASOUND (OUTPATIENT)
Dept: OBGYN CLINIC | Facility: CLINIC | Age: 40
End: 2025-05-15

## 2025-05-15 VITALS — WEIGHT: 194 LBS | BODY MASS INDEX: 33.3 KG/M2 | DIASTOLIC BLOOD PRESSURE: 90 MMHG | SYSTOLIC BLOOD PRESSURE: 138 MMHG

## 2025-05-15 DIAGNOSIS — Z32.01 PREGNANCY, CONFIRMED, NOT FIRST: Primary | ICD-10-CM

## 2025-05-15 PROBLEM — I10 BENIGN ESSENTIAL HYPERTENSION: Status: ACTIVE | Noted: 2021-10-13

## 2025-05-15 LAB
ATRIAL RATE: 75 BPM
BACTERIA UR CULT: NORMAL
HBV SURFACE AB SER-ACNC: >500 MIU/ML
HBV SURFACE AG SER QL: NORMAL
HCV AB SER QL: NORMAL
HIV 1+2 AB+HIV1 P24 AG SERPL QL IA: NORMAL
P AXIS: 64 DEGREES
PR INTERVAL: 154 MS
QRS AXIS: 57 DEGREES
QRSD INTERVAL: 86 MS
QT INTERVAL: 394 MS
QTC INTERVAL: 439 MS
T WAVE AXIS: 16 DEGREES
TREPONEMA PALLIDUM IGG+IGM AB [PRESENCE] IN SERUM OR PLASMA BY IMMUNOASSAY: NORMAL
VENTRICULAR RATE: 75 BPM
VZV IGG SER QL IA: 14.4 S/CO
VZV IGG SER QL IA: POSITIVE

## 2025-05-15 PROCEDURE — 93010 ELECTROCARDIOGRAM REPORT: CPT | Performed by: INTERNAL MEDICINE

## 2025-05-15 NOTE — ED PROCEDURE NOTE
Procedure  POC Pelvic US    Date/Time: 5/14/2025 5:55 PM    Performed by: Garret Kay MD  Authorized by: Garret Kay MD    Patient location:  ED  Procedure details:     Exam Type:  Diagnostic    Indications: evaluate for IUP      Assessment for: confirm intrauterine pregnancy      Technique:  Transabdominal obstetric (HCG+) exam    Views obtained: uterus (transverse and sagittal)      Image quality: diagnostic      Image availability:  Video obtained and still images obtained  Uterine findings:     Intrauterine pregnancy: identified    Other findings:     Free pelvic fluid: not identified      Free peritoneal fluid: not identified    Interpretation:     Pregnancy findings: intrauterine pregnancy (IUP)                     Garret Kay MD  05/14/25 2014

## 2025-05-15 NOTE — PROGRESS NOTES
Assessment/Plan:  Diagnoses and all orders for this visit:    Pregnancy, confirmed, not first  -     AMB US OB < 14 weeks single or first gestation level 1      - Viable IUP @ 9w 1d EGA  - VIVIAN 2025  - Continue PNV  - Patient to call for concerns  - Patient to see MFM for NT on 6/10/25  - Patient to schedule initial PN visit - she had intake visit yesterday 25    Subjective:       Patient ID: Emmett Sharif [id number]    Emmett Sharif is a 39 y.o.  presenting to the office for pregnancy confirmation. Patient's last menstrual period was 2025 (exact date). , placing her at 9w1d today with VIVIAN of 2025.     Patient was seen at the ED yesterday 25) after a K+ level of 2.9 was noted on initial PN labs. Repeat laboratory study at the ED was 3.2. EKG was completed and was unremarkable. Patient was discharged after oral repletion with high dosage of potassium. POC US saw IUP with cardiac activity.     Patient was seen in the office 2025 for an early US due to vaginal bleeding.     Patient currently on labetalol 200mg BID and HCTZ 25mg QD for essential HTN.   MCA stroke 2024 - occurred during last pregnancy.    Regular periods:yes  Nausea:yes  Vomiting: no  Bleeding: yes  Cramping: yes  Headaches: no  Fatigue: yes  Constipation: no  Blood type, if known: A+       OB History    Para Term  AB Living   3 0 0 0 2 0   SAB IAB Ectopic Multiple Live Births   2 0 0 0 0      # Outcome Date GA Lbr Ike/2nd Weight Sex Type Anes PTL Lv   3 Current            2 SAB 2024 6w0d          1 SAB 2024 5w0d                The following portions of the patient's history were reviewed and updated as appropriate: allergies, current medications, past family history, past medical history, past social history, past surgical history, and problem list.    Allergies:  Penicillins    Medications:  Current Medications[1]    Objective:     Visit Vitals  /90   Wt 88 kg (194 lb)   LMP 2025  (Exact Date)   BMI 33.30 kg/m²   OB Status Pregnant   Smoking Status Never   BSA 1.93 m²        GEN: The patient was alert and oriented x3, pleasant well-appearing female in no acute distress.   PULM: nonlabored respirations  MSK: Normal gait  : Minimal bleeding today, small clot in cervix on US but otherwise WNL  Skin: warm, dry  Neuro: no focal deficits  Psych: normal affect and judgement, cooperative    Ultrasound:     Viability US     Gestational sac: present               Location: intrauterine  Yolk sac: present  Fetal pole: present               CRL: 2.38 cm = 9w0d  Cardiac activity: present               Rate: 181 bpm     Ovaries: normal appearing bilaterally  Cul de sac: absence of free fluid  Uterus: normal in appearance           Ultrasound Probe Disinfection    A transvaginal ultrasound was performed.   Prior to use, disinfection was performed with High Level Disinfection Process ([employer])  Probe serial number [id number]: [id number] was used    Faye Rg PA-C  05/15/25  4:27 PM          [1]   Current Outpatient Medications:     aspirin (ECOTRIN LOW STRENGTH) 81 mg EC tablet, Take 1 tablet (81 mg total) by mouth daily, Disp: 30 tablet, Rfl: 0    hydroCHLOROthiazide 25 mg tablet, , Disp: , Rfl:     labetalol (NORMODYNE) 200 mg tablet, Take 200 mg by mouth in the morning and 200 mg in the evening., Disp: , Rfl:     Omega-3 Fatty Acids (OMEGA 3 500 PO), Take by mouth, Disp: , Rfl:     [id number]-[id number]-YH (PRENA1 PO), Take 1 tablet by mouth in the morning., Disp: , Rfl:     VITAMIN D, ERGOCALCIFEROL, PO, Take by mouth, Disp: , Rfl:   No current facility-administered medications for this visit.

## 2025-05-20 ENCOUNTER — NURSE TRIAGE (OUTPATIENT)
Dept: OTHER | Facility: OTHER | Age: 40
End: 2025-05-20

## 2025-05-21 ENCOUNTER — TELEPHONE (OUTPATIENT)
Age: 40
End: 2025-05-21

## 2025-05-21 NOTE — TELEPHONE ENCOUNTER
FOLLOW UP: yes - please outreach to the patient     REASON FOR CONVERSATION: Pregnancy Problem    SYMPTOMS: she is 9w6d, she states she had US on 5/12 and they found clots and she was told she would pass them, she first passed clots on 5/14, today she passed 3 more, first and second clot today was golf ball size, third clot was smaller. No pain.     OTHER: On call provider recommends to monitor and speak with the office 5/21    DISPOSITION: Callback From Office Within 24 Hours (overriding Go to ED Now (or PCP Triage))      Reason for Disposition   Patient sounds very sick or weak to the triager    Protocols used: Pregnancy - Vaginal Bleeding Less Than 20 Weeks EGA-Adult-

## 2025-05-21 NOTE — TELEPHONE ENCOUNTER
"Answer Assessment - Initial Assessment Questions  1. ONSET: \"When did this bleeding start?\"          Clots found on US on 5/12, was told she would pass them, she passed 3 more today    2. BLEEDING SEVERITY: \"Describe the bleeding that you are having.\" \"How much bleeding is there?\"         Light bleeding with passing clots     3. ABDOMEN PAIN: \"Do you have any pain?\" \"How bad is the pain?\"  (e.g., Scale 0-10; none, mild, moderate, or severe)        No pain, has discomfort     4. PREGNANCY: \"Do you know how many weeks or months pregnant you are?\" \"When was the first day of your last normal menstrual period?\"        926d     5. ULTRASOUND: \"Have you had an ultrasound during this pregnancy?\"  Note: To confirm intrauterine pregnancy, placenta location.        5/15     6. HEMODYNAMIC STATUS: \"Are you weak or feeling lightheaded?\" If Yes, ask: \"Can you stand and walk normally?\"         None     7. OTHER SYMPTOMS: \"What other symptoms are you having with the bleeding?\" (e.g., passed tissue, vaginal discharge, fever, menstrual-type cramps)        none    Protocols used: Pregnancy - Vaginal Bleeding Less Than 20 Weeks [id number]    "

## 2025-05-21 NOTE — TELEPHONE ENCOUNTER
Spoke with patient regarding dosing for B6 for nausea.  MyChart message sent as well. Patient advised bleeding has slowed down as well, from previous call today.  Did speak with MA at the office as well today.  Pt verbalized understanding, no further questions or concerns at this time.

## 2025-05-22 ENCOUNTER — NURSE TRIAGE (OUTPATIENT)
Age: 40
End: 2025-05-22

## 2025-05-22 NOTE — TELEPHONE ENCOUNTER
"FOLLOW UP: Discuss with provider and call patient back    REASON FOR CONVERSATION: Vaginal Bleeding - Pregnant    SYMPTOMS:   Intermittent bleeding since last Wednesday- passed 3 clots which she was told to expect.  Passed small clot last evening, followed by red spotting with wiping this morning. Reports also bled yesterday in the morning. Notes bleeding to be after BM.    OTHER: Blood type A+.  Has been following pelvic rest, no strenuous activity. Advised continue to monitor, continue pelvic rest, etc as previously advised. Call back with any increased bleeding, pelvic pain or additional concerns.     DISPOSITION: Discuss with Provider and Call Back Patient    ESC to Dr. Soria  ESC reply: agree with recommendations               Answer Assessment - Initial Assessment Questions  1. ONSET: \"When did this bleeding start?\"        Last Wednesday  2. BLEEDING SEVERITY: \"Describe the bleeding that you are having.\" \"How much bleeding is there?\"       Mild-small clot this morning, small amount of red blood with wiping. Same occurred yesterday, resolved by afternoon.   3. ABDOMEN PAIN: \"Do you have any pain?\" \"How bad is the pain?\"  (e.g., Scale 0-10; none, mild, moderate, or severe)      Mild cramping  4. PREGNANCY: \"Do you know how many weeks or months pregnant you are?\" \"When was the first day of your last normal menstrual period?\"      10w1d  5. ULTRASOUND: \"Have you had an ultrasound during this pregnancy?\"  Note: To confirm intrauterine pregnancy, placenta location.      Yes ,5/12 and 5/15   6. HEMODYNAMIC STATUS: \"Are you weak or feeling lightheaded?\" If Yes, ask: \"Can you stand and walk normally?\"       Weakness- feels related to pregnancy related nausea-supplementing with liquid IV for hydration  7. OTHER SYMPTOMS: \"What other symptoms are you having with the bleeding?\" (e.g., passed tissue, vaginal discharge, fever, menstrual-type cramps)      Tiny clot this morning.    Protocols used: Pregnancy - Vaginal " Bleeding Less Than 20 Weeks EGA-Adult-[place]

## 2025-05-28 ENCOUNTER — NURSE TRIAGE (OUTPATIENT)
Dept: OTHER | Facility: OTHER | Age: 40
End: 2025-05-28

## 2025-05-29 ENCOUNTER — ULTRASOUND (OUTPATIENT)
Dept: OBGYN CLINIC | Facility: CLINIC | Age: 40
End: 2025-05-29

## 2025-05-29 VITALS — SYSTOLIC BLOOD PRESSURE: 124 MMHG | WEIGHT: 184.6 LBS | BODY MASS INDEX: 31.69 KG/M2 | DIASTOLIC BLOOD PRESSURE: 82 MMHG

## 2025-05-29 DIAGNOSIS — O09.521 AMA (ADVANCED MATERNAL AGE) MULTIGRAVIDA 35+, FIRST TRIMESTER: Primary | ICD-10-CM

## 2025-05-29 DIAGNOSIS — Z3A.11 11 WEEKS GESTATION OF PREGNANCY: ICD-10-CM

## 2025-05-29 PROCEDURE — PNV: Performed by: PHYSICIAN ASSISTANT

## 2025-05-29 NOTE — TELEPHONE ENCOUNTER
"REASON FOR CONVERSATION: Vaginal Bleeding  Esc to on call Dr Walsh : Pt is 11 weeks and started having vaginal bleeding after passing a golf ball size blood clot one hour ago and has been happening every 2-3 days. She passes a blood clot and then she starts bleeding tonight the blood went through her clothing and now she has on a pad that is half full. Intermittent cramping .S/he is concerned on why clots/bleeding keep happening.   SYMPTOMS: vaginal bleeding with clots    OTHER HEALTH INFORMATION: 11 weeks pregnant    PROTOCOL DISPOSITION: See PCP Within 3 Days    CARE ADVICE PROVIDED:As per Dr Walsh : The  cramping is due to the bleeding.The  bleeding may be secondary to a subchorionic hematoma that is collecting and then is passing blood and then collecting again.Pt made aware and she verbalized understanding. She was also given care advise to call back in vaginal bleeding increased, cramping increased, any change.   PRACTICE FOLLOW-UP: As per Dr Walsh Pt can come into the office tomorrow for check on viability.  Pt verbalized understanding of Dr Walsh response and would like to be seen tomorrow please call to schedule.       Reason for Disposition   MILD vaginal bleeding (i.e., less than 1 pad / hour; less than patient's usual menstrual bleeding; not just spotting)    Answer Assessment - Initial Assessment Questions  1. ONSET: \"When did this bleeding start?\"        One hour ago and has been happening every 2-3 days.   2. BLEEDING SEVERITY: \"Describe the bleeding that you are having.\" \"How much bleeding is there?\"       She started bleeding one hour ago started heavenly current the bleeding is filling up 1/2 a pad with clotting  3. ABDOMEN PAIN: \"Do you have any pain?\" \"How bad is the pain?\"  (e.g., Scale 0-10; none, mild, moderate, or severe)      Intermittent cramping today  4. PREGNANCY: \"Do you know how many weeks or months pregnant you are?\" \"When was the first day of your last normal menstrual period?\"      11 " "weeks  5. ULTRASOUND: \"Have you had an ultrasound during this pregnancy?\"  Note: To confirm intrauterine pregnancy, placenta location.      Last ultrasound was on Sunday and private practice   6. HEMODYNAMIC STATUS: \"Are you weak or feeling lightheaded?\" If Yes, ask: \"Can you stand and walk normally?\"       none  7. OTHER SYMPTOMS: \"What other symptoms are you having with the bleeding?\" (e.g., passed tissue, vaginal discharge, fever, menstrual-type cramps)      Passed clot size of golf ball. Concern on why clots keep happening.    Protocols used: Pregnancy - Vaginal Bleeding Less Than 20 Weeks [id number]    "

## 2025-06-10 ENCOUNTER — ROUTINE PRENATAL (OUTPATIENT)
Facility: HOSPITAL | Age: 40
End: 2025-06-10
Attending: OBSTETRICS & GYNECOLOGY
Payer: COMMERCIAL

## 2025-06-10 ENCOUNTER — ANCILLARY PROCEDURE (OUTPATIENT)
Facility: HOSPITAL | Age: 40
End: 2025-06-10
Attending: OBSTETRICS & GYNECOLOGY
Payer: COMMERCIAL

## 2025-06-10 ENCOUNTER — INITIAL PRENATAL (OUTPATIENT)
Dept: OBGYN CLINIC | Facility: CLINIC | Age: 40
End: 2025-06-10

## 2025-06-10 VITALS
DIASTOLIC BLOOD PRESSURE: 60 MMHG | SYSTOLIC BLOOD PRESSURE: 118 MMHG | WEIGHT: 186 LBS | HEIGHT: 64 IN | BODY MASS INDEX: 31.76 KG/M2

## 2025-06-10 VITALS
BODY MASS INDEX: 31.54 KG/M2 | SYSTOLIC BLOOD PRESSURE: 122 MMHG | DIASTOLIC BLOOD PRESSURE: 88 MMHG | HEIGHT: 64 IN | HEART RATE: 98 BPM | WEIGHT: 184.75 LBS

## 2025-06-10 DIAGNOSIS — O09.521 AMA (ADVANCED MATERNAL AGE) MULTIGRAVIDA 35+, FIRST TRIMESTER: Primary | ICD-10-CM

## 2025-06-10 DIAGNOSIS — Z3A.12 12 WEEKS GESTATION OF PREGNANCY: ICD-10-CM

## 2025-06-10 DIAGNOSIS — Z3A.12 12 WEEKS GESTATION OF PREGNANCY: Primary | ICD-10-CM

## 2025-06-10 DIAGNOSIS — O09.521 MULTIGRAVIDA OF ADVANCED MATERNAL AGE IN FIRST TRIMESTER: ICD-10-CM

## 2025-06-10 PROCEDURE — 76813 OB US NUCHAL MEAS 1 GEST: CPT | Performed by: OBSTETRICS & GYNECOLOGY

## 2025-06-10 PROCEDURE — 87491 CHLMYD TRACH DNA AMP PROBE: CPT | Performed by: PHYSICIAN ASSISTANT

## 2025-06-10 PROCEDURE — 99213 OFFICE O/P EST LOW 20 MIN: CPT | Performed by: OBSTETRICS & GYNECOLOGY

## 2025-06-10 PROCEDURE — 76801 OB US < 14 WKS SINGLE FETUS: CPT | Performed by: OBSTETRICS & GYNECOLOGY

## 2025-06-10 PROCEDURE — NC001 PR NO CHARGE: Performed by: OBSTETRICS & GYNECOLOGY

## 2025-06-10 PROCEDURE — PNV: Performed by: PHYSICIAN ASSISTANT

## 2025-06-10 PROCEDURE — G0476 HPV COMBO ASSAY CA SCREEN: HCPCS | Performed by: PHYSICIAN ASSISTANT

## 2025-06-10 PROCEDURE — G0145 SCR C/V CYTO,THINLAYER,RESCR: HCPCS | Performed by: PHYSICIAN ASSISTANT

## 2025-06-10 PROCEDURE — 87591 N.GONORRHOEAE DNA AMP PROB: CPT | Performed by: PHYSICIAN ASSISTANT

## 2025-06-10 RX ORDER — DIPHENHYDRAMINE HYDROCHLORIDE 25 MG/1
25 CAPSULE ORAL DAILY
COMMUNITY

## 2025-06-10 NOTE — PROGRESS NOTES
Patient is a 40 YO  female presenting to the office at 12.6 weeks for routine OB care.   BP: 118/60  TWG: -6lb  Fetal Movement: none yet    39 y.o.  female at 12w6d (Estimated Date of Delivery: 25) for PNV.    Pre- Vitals      Flowsheet Row Most Recent Value   Prenatal Assessment    Movement Absent   Prenatal Vitals    Blood Pressure 118/60   Weight - Scale 84.4 kg (186 lb)   Urine Albumin/Glucose    Dilation/Effacement/Station    Vaginal Drainage    Edema           TWG: -2.722 kg (-6 lb)    Cramping: no  Bleeding: no  LOF: no  NT/13 week scan scheduled: yes  Anatomy scan scheduled   Declined NIPT  AFP ordered if indicated: declines   Prenatal labs complete (including Heb B, HIV): yes; date completed   Pap collected: yes  GC collected:yes  OK to transfuse and code  Oriented to practice/delivery location.   RTO 4 weeks

## 2025-06-11 LAB
HPV HR 12 DNA CVX QL NAA+PROBE: NEGATIVE
HPV16 DNA CVX QL NAA+PROBE: NEGATIVE
HPV18 DNA CVX QL NAA+PROBE: NEGATIVE

## 2025-06-12 ENCOUNTER — RESULTS FOLLOW-UP (OUTPATIENT)
Dept: OBGYN CLINIC | Facility: CLINIC | Age: 40
End: 2025-06-12

## 2025-06-12 LAB
C TRACH DNA SPEC QL NAA+PROBE: NEGATIVE
N GONORRHOEA DNA SPEC QL NAA+PROBE: NEGATIVE

## 2025-06-12 NOTE — PROGRESS NOTES
Emmett presents today for a genetic screening ultrasound.  This is her third pregnancy.  She has a significant history of chronic hypertension status post an acute ischemic left middle cerebral artery stroke with minimal residual effect.  She is on chronic low-dose aspirin and labetalol 200 mg twice a day.  She had a preconception consultation with Dr. Ferreira last year.  A review of systems is otherwise negative.    We discussed the options for genetic screening.  At the conclusion of our discussion the patient declined maternal serum screening to delineate her risk for fetal aneuploidy.    We discussed the implications of hypertension pregnancy in that there is increased risk for adverse pregnancy outcome, particularly related to hypertensive disorders of pregnancy such as preeclampsia as well as ischemic placental disease which can lead to fetal growth restriction, PTB, and abruption.  A recent RCT (CHAP trial) suggests tighter control of blood pressure (<130/80) with antihypertensive therapy may mitigate this risk of the development of a composite of preeclampsia with severe features,  birth less than 35 weeks, abruption and /fetal death. Rates of these adverse outcomes among pregnant women who received treatment were 30.2% vs. 37% with those who were not treated. When looking at each outcome individually, antihypertensive treatment significantly lowered the risk for preeclampsia and birth before 35 weeks. I recommend antihypertensive therapy when there is persistent hypertension greater than 130/80.  surveillance is recommended starting at 32 weeks if the patient's blood pressures are greater than 140/90 or if she is on medications. Delivery timing is based on BP control and use of medications and should be considered in accordance with ACOG recommendations although recent data from CHAP suggest delivery at 39 weeks gestation improved  outcomes without an increased risk for  "maternal outcomes.    We discussed follow-up in detail and I recommend an anatomy ultrasound be scheduled for 20 weeks gestation.    Thank you very much for allowing us to participate in the care of this very nice patient. Should you have any questions, please do not hesitate to contact our office.    Portions of the record may have been created with voice recognition software. Occasional wrong word or \"sound a like\" substitutions may have occurred due to the inherent limitations of voice recognition software. Read the chart carefully and recognize, using context, where substitutions have occurred.  "

## 2025-06-16 ENCOUNTER — RESULTS FOLLOW-UP (OUTPATIENT)
Dept: OBGYN CLINIC | Facility: CLINIC | Age: 40
End: 2025-06-16

## 2025-06-16 LAB
LAB AP GYN PRIMARY INTERPRETATION: NORMAL
LAB AP LMP: NORMAL
Lab: NORMAL

## 2025-06-26 ENCOUNTER — NURSE TRIAGE (OUTPATIENT)
Age: 40
End: 2025-06-26

## 2025-06-26 NOTE — TELEPHONE ENCOUNTER
"REASON FOR CONVERSATION: Pregnancy Problem    SYMPTOMS: nausea and vomiting in pregnancy    OTHER HEALTH INFORMATION: 15w1d---Nausea and vomiting had gotten better last week, but returned this past Monday. Patient checking in as she report 2lb weight loss. Still taking unisom and b6; wants to avoid any other meds at this time.     PROTOCOL DISPOSITION: See Within 3 Days in Office    CARE ADVICE PROVIDED: nausea and vomiting management; s/s of severe dehydration; when to call back    PRACTICE FOLLOW-UP: n/a      Reason for Disposition   MILD vomiting (e.g., 1-2 times / day) and present > 3 days and started after the 9th week of pregnancy    Answer Assessment - Initial Assessment Questions  1. SEVERITY - NAUSEA: \"How bad is the nausea?\" (e.g., none; mild, moderate, severe)      moderate  2. SEVERITY - VOMITING: \"Are you vomiting?\" If Yes, ask: \"How many times have you vomited in the past 24 hours?\" (e.g., nausea only; mild, moderate or severe vomiting)      About 3-4x  3. ONSET: \"When did the nausea or vomiting begin?\"       On going  4. FLUIDS: \"What fluids or food have you vomited up today?\" \"Are you able to keep any liquids down?\"      none  5. TREATMENT: \"What have you been doing so far to treat this?\"       Vit b6 and unisom  6. DEHYDRATION: \"When was the last time you urinated?\" \"Are you feeling lightheaded?\" \"Weight loss?\"      Last urination 20 mins ago; 2lb weight loss  7. PREGNANCY: \"How many weeks pregnant are you?\" \"How has the pregnancy been going?\"      15w1d  8. VIVIAN: \"What date are you expecting to deliver?\"      25  9. MEDICINES: \"What medicines are you taking?\" (e.g., iron, opioid pain medicines, prenatal vitamins, vitamin B6)      PNV  10. OTHER SYMPTOMS: \"Do you have any other symptoms?\" (e.g., diarrhea, fever)        Heart burn    Protocols used: Pregnancy - Morning Sickness (Nausea and Vomiting of Pregnancy)-Adult-[place]    "

## 2025-07-10 ENCOUNTER — ROUTINE PRENATAL (OUTPATIENT)
Dept: OBGYN CLINIC | Facility: CLINIC | Age: 40
End: 2025-07-10

## 2025-07-10 VITALS — DIASTOLIC BLOOD PRESSURE: 70 MMHG | WEIGHT: 181.4 LBS | BODY MASS INDEX: 31.14 KG/M2 | SYSTOLIC BLOOD PRESSURE: 110 MMHG

## 2025-07-10 DIAGNOSIS — O09.522 AMA (ADVANCED MATERNAL AGE) MULTIGRAVIDA 35+, SECOND TRIMESTER: Primary | ICD-10-CM

## 2025-07-10 DIAGNOSIS — Z3A.17 17 WEEKS GESTATION OF PREGNANCY: ICD-10-CM

## 2025-07-10 DIAGNOSIS — I63.512 ACUTE ISCHEMIC LEFT MIDDLE CEREBRAL ARTERY (MCA) STROKE (HCC): ICD-10-CM

## 2025-07-10 DIAGNOSIS — I10 PRIMARY HYPERTENSION: ICD-10-CM

## 2025-07-10 PROCEDURE — PNV: Performed by: PHYSICIAN ASSISTANT

## 2025-07-10 NOTE — PROGRESS NOTES
Emmett Sharif is [id number], here for her routine ob appt; pt denies any LOF, VB, or CTXs.  SDOH screening completed today.

## 2025-07-10 NOTE — PROGRESS NOTES
39 y.o.  female at 17w1d (Estimated Date of Delivery: 25) for PNV.    Pre- Vitals      Flowsheet Row Most Recent Value   Prenatal Assessment    Movement Present   Prenatal Vitals    Blood Pressure 110/70   Weight - Scale 82.3 kg (181 lb 6.4 oz)   Urine Albumin/Glucose    Dilation/Effacement/Station    Vaginal Drainage    Edema           TWG: -4.808 kg (-10 lb 9.6 oz)    Leakage of fluid: no  Vaginal bleeding: no  Contractions/Cramping: no  Fetal movement: no  Overall,well  20 week US scheduled    RTO in 4 weeks.

## 2025-07-21 ENCOUNTER — TELEPHONE (OUTPATIENT)
Dept: OBGYN CLINIC | Facility: CLINIC | Age: 40
End: 2025-07-21

## 2025-07-21 DIAGNOSIS — Z34.82 PRENATAL CARE, SUBSEQUENT PREGNANCY, SECOND TRIMESTER: Primary | ICD-10-CM

## 2025-07-21 NOTE — TELEPHONE ENCOUNTER
.Overall how are you doing?  Pretty good     Compliant with routine OB care appointments?  Just trying to scheduling the tour     Have you completed your 1st trimester labs?  yes    If you had NIPS with MFM, do you have a order for [facility]?   Opted out    Can be completed 15w-22w6d, ideally 16w-18w    Have you seen MFM and do you have your detailed US scheduled? 07/30/2025    Pregnancy Education-have you had a chance to review the classes offered and registered? Yes registered

## 2025-07-27 PROBLEM — O09.522 MULTIGRAVIDA OF ADVANCED MATERNAL AGE IN SECOND TRIMESTER: Status: ACTIVE | Noted: 2025-06-10

## 2025-07-27 PROBLEM — O16.2 HYPERTENSION AFFECTING PREGNANCY IN SECOND TRIMESTER: Status: ACTIVE | Noted: 2024-06-13

## 2025-07-30 ENCOUNTER — APPOINTMENT (OUTPATIENT)
Dept: LAB | Facility: CLINIC | Age: 40
End: 2025-07-30
Attending: OBSTETRICS & GYNECOLOGY
Payer: COMMERCIAL

## 2025-07-30 ENCOUNTER — ANCILLARY PROCEDURE (OUTPATIENT)
Facility: HOSPITAL | Age: 40
End: 2025-07-30
Attending: OBSTETRICS & GYNECOLOGY
Payer: COMMERCIAL

## 2025-07-30 ENCOUNTER — ROUTINE PRENATAL (OUTPATIENT)
Facility: HOSPITAL | Age: 40
End: 2025-07-30
Payer: COMMERCIAL

## 2025-07-30 VITALS
BODY MASS INDEX: 31.14 KG/M2 | DIASTOLIC BLOOD PRESSURE: 60 MMHG | WEIGHT: 182.4 LBS | HEART RATE: 82 BPM | SYSTOLIC BLOOD PRESSURE: 112 MMHG | HEIGHT: 64 IN

## 2025-07-30 DIAGNOSIS — O09.522 MULTIGRAVIDA OF ADVANCED MATERNAL AGE IN SECOND TRIMESTER: ICD-10-CM

## 2025-07-30 DIAGNOSIS — Z3A.19 19 WEEKS GESTATION OF PREGNANCY: ICD-10-CM

## 2025-07-30 DIAGNOSIS — O16.2 HYPERTENSION AFFECTING PREGNANCY IN SECOND TRIMESTER: Primary | ICD-10-CM

## 2025-07-30 DIAGNOSIS — Z3A.20 20 WEEKS GESTATION OF PREGNANCY: ICD-10-CM

## 2025-07-30 DIAGNOSIS — E03.9 HYPOTHYROID IN PREGNANCY, ANTEPARTUM: ICD-10-CM

## 2025-07-30 DIAGNOSIS — I63.512 ACUTE ISCHEMIC LEFT MIDDLE CEREBRAL ARTERY (MCA) STROKE (HCC): ICD-10-CM

## 2025-07-30 DIAGNOSIS — O99.280 HYPOTHYROID IN PREGNANCY, ANTEPARTUM: ICD-10-CM

## 2025-07-30 DIAGNOSIS — Z34.82 PRENATAL CARE, SUBSEQUENT PREGNANCY, SECOND TRIMESTER: ICD-10-CM

## 2025-07-30 PROCEDURE — 99214 OFFICE O/P EST MOD 30 MIN: CPT | Performed by: OBSTETRICS & GYNECOLOGY

## 2025-07-30 PROCEDURE — 76811 OB US DETAILED SNGL FETUS: CPT | Performed by: OBSTETRICS & GYNECOLOGY

## 2025-07-30 PROCEDURE — 82105 ALPHA-FETOPROTEIN SERUM: CPT

## 2025-07-30 PROCEDURE — 76817 TRANSVAGINAL US OBSTETRIC: CPT | Performed by: OBSTETRICS & GYNECOLOGY

## 2025-07-30 PROCEDURE — NC001 PR NO CHARGE: Performed by: OBSTETRICS & GYNECOLOGY

## 2025-07-30 PROCEDURE — 36415 COLL VENOUS BLD VENIPUNCTURE: CPT

## 2025-07-31 ENCOUNTER — TELEPHONE (OUTPATIENT)
Age: 40
End: 2025-07-31

## 2025-08-02 LAB
2ND TRIMESTER 4 SCREEN SERPL-IMP: NORMAL
AFP ADJ MOM SERPL: 0.7
AFP INTERP AMN-IMP: NORMAL
AFP INTERP SERPL-IMP: NORMAL
AFP INTERP SERPL-IMP: NORMAL
AFP SERPL-MCNC: 38.8 NG/ML
AGE AT DELIVERY: 38.2 YR
GA METHOD: NORMAL
GA: 20 WEEKS
IDDM PATIENT QL: NO
MULTIPLE PREGNANCY: NO
NEURAL TUBE DEFECT RISK FETUS: NORMAL %

## 2025-08-05 ENCOUNTER — APPOINTMENT (OUTPATIENT)
Dept: LAB | Facility: CLINIC | Age: 40
End: 2025-08-05
Payer: COMMERCIAL

## 2025-08-05 DIAGNOSIS — I63.9 CVA (CEREBRAL VASCULAR ACCIDENT) (HCC): ICD-10-CM

## 2025-08-05 DIAGNOSIS — I63.512 ACUTE ISCHEMIC LEFT MIDDLE CEREBRAL ARTERY (MCA) STROKE (HCC): ICD-10-CM

## 2025-08-05 DIAGNOSIS — Z3A.20 20 WEEKS GESTATION OF PREGNANCY: ICD-10-CM

## 2025-08-05 DIAGNOSIS — Z31.69 ENCOUNTER FOR PRECONCEPTION CONSULTATION: ICD-10-CM

## 2025-08-05 LAB
DEPRECATED AT III PPP: 91 % OF NORMAL (ref 92–136)
TSH SERPL DL<=0.05 MIU/L-ACNC: 2.51 UIU/ML (ref 0.45–4.5)

## 2025-08-05 PROCEDURE — 85670 THROMBIN TIME PLASMA: CPT

## 2025-08-05 PROCEDURE — 85303 CLOT INHIBIT PROT C ACTIVITY: CPT

## 2025-08-05 PROCEDURE — 86147 CARDIOLIPIN ANTIBODY EA IG: CPT

## 2025-08-05 PROCEDURE — 36415 COLL VENOUS BLD VENIPUNCTURE: CPT

## 2025-08-05 PROCEDURE — 84443 ASSAY THYROID STIM HORMONE: CPT | Performed by: OBSTETRICS & GYNECOLOGY

## 2025-08-05 PROCEDURE — 85705 THROMBOPLASTIN INHIBITION: CPT

## 2025-08-05 PROCEDURE — 85613 RUSSELL VIPER VENOM DILUTED: CPT

## 2025-08-05 PROCEDURE — 85732 THROMBOPLASTIN TIME PARTIAL: CPT

## 2025-08-05 PROCEDURE — 86146 BETA-2 GLYCOPROTEIN ANTIBODY: CPT

## 2025-08-05 PROCEDURE — 85300 ANTITHROMBIN III ACTIVITY: CPT

## 2025-08-07 ENCOUNTER — ROUTINE PRENATAL (OUTPATIENT)
Dept: OBGYN CLINIC | Facility: CLINIC | Age: 40
End: 2025-08-07

## 2025-08-07 VITALS — WEIGHT: 190 LBS | SYSTOLIC BLOOD PRESSURE: 102 MMHG | DIASTOLIC BLOOD PRESSURE: 62 MMHG | BODY MASS INDEX: 32.61 KG/M2

## 2025-08-07 DIAGNOSIS — O16.2 HYPERTENSION AFFECTING PREGNANCY IN SECOND TRIMESTER: Primary | ICD-10-CM

## 2025-08-07 DIAGNOSIS — O09.522 AMA (ADVANCED MATERNAL AGE) MULTIGRAVIDA 35+, SECOND TRIMESTER: ICD-10-CM

## 2025-08-07 DIAGNOSIS — Z3A.21 21 WEEKS GESTATION OF PREGNANCY: ICD-10-CM

## 2025-08-07 DIAGNOSIS — I63.512 ACUTE ISCHEMIC LEFT MIDDLE CEREBRAL ARTERY (MCA) STROKE (HCC): ICD-10-CM

## 2025-08-07 LAB — PROT C AG ACT/NOR PPP IA: 142 % OF NORMAL (ref 60–150)

## 2025-08-07 PROCEDURE — PNV

## 2025-08-08 LAB
B2 GLYCOPROT1 IGA SERPL IA-ACNC: <0.3
B2 GLYCOPROT1 IGG SERPL IA-ACNC: <0.8
B2 GLYCOPROT1 IGM SERPL IA-ACNC: <2.4

## 2025-08-09 LAB
APTT SCREEN TO CONFIRM RATIO: 1.14 RATIO (ref 0–1.34)
APTT-LA 1H NP PPP: 32.4 SEC (ref 0–40.5)
APTT-LA IMM 4:1 NP PPP: 28.8 SEC (ref 0–40.5)
CONFIRM APTT/NORMAL: 40.4 SEC (ref 0–47.6)
LA PPP-IMP: ABNORMAL
SCREEN APTT: 45.3 SEC (ref 0–43.5)
SCREEN DRVVT: 41.3 SEC (ref 0–47)
THROMBIN TIME: 18.2 SEC (ref 0–23)

## 2025-08-10 LAB
CARDIOLIPIN IGA SER IA-ACNC: 1.4
CARDIOLIPIN IGG SER IA-ACNC: 0.9
CARDIOLIPIN IGM SER IA-ACNC: <0.9

## 2025-08-18 ENCOUNTER — TELEPHONE (OUTPATIENT)
Dept: NEUROLOGY | Facility: CLINIC | Age: 40
End: 2025-08-18

## 2025-08-20 ENCOUNTER — OFFICE VISIT (OUTPATIENT)
Dept: NEUROLOGY | Facility: CLINIC | Age: 40
End: 2025-08-20
Payer: COMMERCIAL

## 2025-08-20 VITALS
TEMPERATURE: 96.9 F | WEIGHT: 197 LBS | DIASTOLIC BLOOD PRESSURE: 72 MMHG | BODY MASS INDEX: 33.63 KG/M2 | SYSTOLIC BLOOD PRESSURE: 100 MMHG | HEART RATE: 74 BPM | HEIGHT: 64 IN | OXYGEN SATURATION: 100 %

## 2025-08-20 DIAGNOSIS — O16.2 HYPERTENSION AFFECTING PREGNANCY IN SECOND TRIMESTER: Primary | ICD-10-CM

## 2025-08-20 DIAGNOSIS — R51.9 HEADACHE: ICD-10-CM

## 2025-08-20 DIAGNOSIS — I63.512 ACUTE ISCHEMIC LEFT MIDDLE CEREBRAL ARTERY (MCA) STROKE (HCC): ICD-10-CM

## 2025-08-20 PROCEDURE — 99215 OFFICE O/P EST HI 40 MIN: CPT | Performed by: PSYCHIATRY & NEUROLOGY
